# Patient Record
Sex: FEMALE | Race: WHITE | NOT HISPANIC OR LATINO | Employment: UNEMPLOYED | URBAN - NONMETROPOLITAN AREA
[De-identification: names, ages, dates, MRNs, and addresses within clinical notes are randomized per-mention and may not be internally consistent; named-entity substitution may affect disease eponyms.]

---

## 2019-02-01 ENCOUNTER — APPOINTMENT (OUTPATIENT)
Dept: FAMILY MEDICINE | Facility: CLINIC | Age: 24
End: 2019-02-01

## 2019-02-01 PROCEDURE — 99499 UNLISTED E&M SERVICE: CPT | Performed by: NURSE PRACTITIONER

## 2019-10-21 ENCOUNTER — OFFICE VISIT (OUTPATIENT)
Dept: URGENT CARE | Facility: URGENT CARE | Age: 24
End: 2019-10-21

## 2019-10-21 VITALS
RESPIRATION RATE: 20 BRPM | SYSTOLIC BLOOD PRESSURE: 123 MMHG | HEART RATE: 95 BPM | DIASTOLIC BLOOD PRESSURE: 73 MMHG | OXYGEN SATURATION: 99 % | TEMPERATURE: 98.2 F | WEIGHT: 262.6 LBS

## 2019-10-21 DIAGNOSIS — K04.7 DENTAL INFECTION: Primary | ICD-10-CM

## 2019-10-21 PROBLEM — F11.20: Status: ACTIVE | Noted: 2018-06-09

## 2019-10-21 PROBLEM — A41.9 SEPSIS (H): Status: ACTIVE | Noted: 2018-05-22

## 2019-10-21 PROBLEM — O09.73 SUPERVISION OF HIGH RISK PREGNANCY DUE TO SOCIAL PROBLEMS IN THIRD TRIMESTER: Status: ACTIVE | Noted: 2017-07-28

## 2019-10-21 PROBLEM — N17.9 AKI (ACUTE KIDNEY INJURY) (H): Status: ACTIVE | Noted: 2019-10-21

## 2019-10-21 PROBLEM — R93.89 ABNORMAL CT OF THE CHEST: Status: ACTIVE | Noted: 2018-07-02

## 2019-10-21 PROBLEM — F19.10 SUBSTANCE ABUSE (H): Status: ACTIVE | Noted: 2017-10-02

## 2019-10-21 PROBLEM — Z87.898 HISTORY OF INTRAVENOUS DRUG USE IN REMISSION: Status: ACTIVE | Noted: 2018-06-09

## 2019-10-21 PROBLEM — L02.415 ABSCESS OF HIP, RIGHT: Status: ACTIVE | Noted: 2018-06-09

## 2019-10-21 PROBLEM — F11.10: Status: ACTIVE | Noted: 2017-07-28

## 2019-10-21 PROBLEM — M25.551 ACUTE RIGHT HIP PAIN: Status: ACTIVE | Noted: 2018-06-09

## 2019-10-21 PROBLEM — O99.323: Status: ACTIVE | Noted: 2017-07-28

## 2019-10-21 PROBLEM — Z79.891 CHRONIC USE OF OPIATE DRUG FOR THERAPEUTIC PURPOSE: Status: ACTIVE | Noted: 2018-06-09

## 2019-10-21 PROBLEM — I33.0 ENDOCARDITIS DUE TO METHICILLIN SUSCEPTIBLE STAPHYLOCOCCUS AUREUS (MSSA): Status: ACTIVE | Noted: 2018-06-09

## 2019-10-21 PROBLEM — M60.09 INFECTIVE MYOSITIS OF MULTIPLE SITES: Status: ACTIVE | Noted: 2018-06-09

## 2019-10-21 PROBLEM — Z79.899 MEDICATION MANAGEMENT: Status: ACTIVE | Noted: 2018-07-13

## 2019-10-21 PROBLEM — O09.33 INSUFFICIENT PRENATAL CARE IN THIRD TRIMESTER: Status: ACTIVE | Noted: 2017-04-20

## 2019-10-21 PROBLEM — F15.91 HISTORY OF METHAMPHETAMINE USE: Status: ACTIVE | Noted: 2018-06-09

## 2019-10-21 PROBLEM — B95.61 ENDOCARDITIS DUE TO METHICILLIN SUSCEPTIBLE STAPHYLOCOCCUS AUREUS (MSSA): Status: ACTIVE | Noted: 2018-06-09

## 2019-10-21 PROBLEM — R82.5 POSITIVE URINE DRUG SCREEN: Status: ACTIVE | Noted: 2017-04-03

## 2019-10-21 PROCEDURE — 99203 OFFICE O/P NEW LOW 30 MIN: CPT | Performed by: FAMILY MEDICINE

## 2019-10-21 RX ORDER — AMOXICILLIN 875 MG
875 TABLET ORAL 2 TIMES DAILY
Qty: 20 TABLET | Refills: 0 | Status: SHIPPED | OUTPATIENT
Start: 2019-10-21 | End: 2019-10-31

## 2019-10-21 RX ORDER — FERROUS SULFATE 325(65) MG
325 TABLET ORAL
COMMUNITY
End: 2024-07-31

## 2019-10-21 ASSESSMENT — PAIN SCALES - GENERAL: PAINLEVEL: SEVERE PAIN (7)

## 2019-10-21 NOTE — PROGRESS NOTES
SUBJECTIVE:  Chief Complaint   Patient presents with     Dental Problem     Lower right widom tooth infected. Pain x1 week.     Aditi Lei is a 24 year old female with a chief complaint of dental pain .  Onset of symptoms was 7 day(s) ago.  Her right lower 3rd molar (wisdom tooth) is impacted and infected-  Had past plan for removal, but cancelled due to hospitalization  She has attempted to arrange emergency dental care no  Course of illness: gradual onset, still present, worsening and constant.    Severity:  moderate.  Current and Associated symptoms:  Local pain and some drainage  Treatment measures tried include Tylenol/Ibuprofen     Patient Active Problem List   Diagnosis     Abnormal CT of the chest     Abscess of hip, right     Acute right hip pain     EMILIA (acute kidney injury) (H)     Chronic use of opiate drug for therapeutic purpose     Supervision of high risk pregnancy due to social problems in third trimester     Endocarditis due to methicillin susceptible Staphylococcus aureus (MSSA)     Heroin abuse affecting pregnancy in third trimester (H)     History of intravenous drug use in remission     History of methamphetamine use     Infective myositis of multiple sites     Medication management     Insufficient prenatal care in third trimester     Opioid use disorder, severe, in controlled environment, dependence (H)     Positive urine drug screen      labor in third trimester with  delivery     Sepsis (H)     Substance abuse (H)     Vaginal delivery       ALLERGIES:  Patient has no known allergies.    MEDs  ferrous sulfate (FEROSUL) 325 (65 Fe) MG tablet, Take 325 mg by mouth daily (with breakfast)    No current facility-administered medications on file prior to visit.       Social History     Tobacco Use     Smoking status: Current Every Day Smoker     Packs/day: 1.00     Years: 5.00     Pack years: 5.00     Types: Cigarettes     Smokeless tobacco: Never Used   Substance Use Topics      Alcohol use: Not on file     Family History:  Non-contributory,  No associated family health conditions    ROS:  CONSTITUTIONAL:NEGATIVE for fever, chills,   INTEGUMENTARY/SKIN: NEGATIVE for worrisome rashes,  or lesions  EYES: NEGATIVE for vision changes or irritation  RESP:NEGATIVE for significant cough or SOB       OBJECTIVE:   /73   Pulse 95   Temp 98.2  F (36.8  C) (Oral)   Resp 20   Wt 119.1 kg (262 lb 9.6 oz)   SpO2 99%   Patient indicates that the following tooth is causing the pain symptoms:  Right  lower 3rd molar   No noted Caries of the painful tooth with  purulent drainage noted:    Pain with percussion over the affected tooth    Swelling of the gingiva and cheek adjacent to the affected tooth:       EYES:   EOMI,   conjunctiva clear  HENT:   External ears with no swelling or lesions   Nose and lips without  Swelling, ulcers, erythema or lesions  NECK:   normal pain free ROM  RESP:   no labored respirations, no tachypnea  EXTREMITIES:   Full ROM without expression of pain or limitation x 4 extremities  NEURO:   Normal strength and tone, ambulation without difficulty,   normal speech and mentation  SKIN:  no suspicious lesions or rashes  PSYCH:   mentation and affect appears normal and patient appearance--appropriately groomed          ASSESSMENT:  Dental infection      - amoxicillin (AMOXIL) 875 MG tablet; Take 1 tablet (875 mg) by mouth 2 times daily for 10 days       Recommend that patient swish and spit with 10:1 mixture of water and hydrogen peroxide to reduce debris around the infected area     Symptomatic treatment    OTC analgesic (ibuprofen and or acetaminophen)  as needed.   Follow-up with primary dental  clinic for definitive therapy.

## 2024-02-25 ENCOUNTER — HEALTH MAINTENANCE LETTER (OUTPATIENT)
Age: 29
End: 2024-02-25

## 2024-07-31 ENCOUNTER — OFFICE VISIT (OUTPATIENT)
Dept: FAMILY MEDICINE | Facility: CLINIC | Age: 29
End: 2024-07-31
Payer: COMMERCIAL

## 2024-07-31 VITALS
DIASTOLIC BLOOD PRESSURE: 64 MMHG | SYSTOLIC BLOOD PRESSURE: 93 MMHG | WEIGHT: 267 LBS | HEART RATE: 80 BPM | TEMPERATURE: 98.5 F | RESPIRATION RATE: 9 BRPM | HEIGHT: 66 IN | BODY MASS INDEX: 42.91 KG/M2 | OXYGEN SATURATION: 97 %

## 2024-07-31 DIAGNOSIS — I33.0 INFECTIVE ENDOCARDITIS OF TRICUSPID VALVE: ICD-10-CM

## 2024-07-31 DIAGNOSIS — Z98.84 S/P BILIOPANCREATIC DIVERSION WITH DUODENAL SWITCH: ICD-10-CM

## 2024-07-31 DIAGNOSIS — F19.11 HISTORY OF MIXED DRUG ABUSE (H): ICD-10-CM

## 2024-07-31 DIAGNOSIS — Z01.818 PREOP GENERAL PHYSICAL EXAM: Primary | ICD-10-CM

## 2024-07-31 DIAGNOSIS — I36.1 NONRHEUMATIC TRICUSPID VALVE REGURGITATION: ICD-10-CM

## 2024-07-31 DIAGNOSIS — I50.32 CHRONIC HEART FAILURE WITH PRESERVED EJECTION FRACTION (HFPEF) (H): ICD-10-CM

## 2024-07-31 DIAGNOSIS — Z86.711 PERSONAL HISTORY OF PE (PULMONARY EMBOLISM): ICD-10-CM

## 2024-07-31 PROBLEM — Z86.718 HISTORY OF MATERNAL DEEP VEIN THROMBOSIS (DVT): Status: ACTIVE | Noted: 2022-06-08

## 2024-07-31 PROBLEM — F32.A DEPRESSION, UNSPECIFIED: Status: ACTIVE | Noted: 2021-10-28

## 2024-07-31 PROBLEM — Z87.59 HISTORY OF MATERNAL DEEP VEIN THROMBOSIS (DVT): Status: ACTIVE | Noted: 2022-06-08

## 2024-07-31 PROBLEM — M54.32 LEFT SCIATIC NERVE PAIN: Status: ACTIVE | Noted: 2021-11-22

## 2024-07-31 PROBLEM — F15.11 HISTORY OF METHAMPHETAMINE ABUSE (H): Status: ACTIVE | Noted: 2021-10-28

## 2024-07-31 PROBLEM — K44.9 HIATAL HERNIA: Status: ACTIVE | Noted: 2024-04-19

## 2024-07-31 PROBLEM — Z90.49 S/P LAPAROSCOPIC CHOLECYSTECTOMY: Status: ACTIVE | Noted: 2024-04-19

## 2024-07-31 PROBLEM — M54.16 LUMBAR RADICULOPATHY: Status: ACTIVE | Noted: 2021-10-20

## 2024-07-31 PROBLEM — R01.1 HEART MURMUR: Status: ACTIVE | Noted: 2021-11-22

## 2024-07-31 PROBLEM — Z86.79 HISTORY OF SBE (SUBACUTE BACTERIAL ENDOCARDITIS): Status: ACTIVE | Noted: 2021-10-20

## 2024-07-31 PROBLEM — E55.9 VITAMIN D DEFICIENCY: Status: ACTIVE | Noted: 2021-10-30

## 2024-07-31 PROBLEM — M21.372 LEFT FOOT DROP: Status: ACTIVE | Noted: 2021-10-28

## 2024-07-31 PROBLEM — G47.00 INSOMNIA: Status: ACTIVE | Noted: 2021-12-16

## 2024-07-31 LAB
ERYTHROCYTE [DISTWIDTH] IN BLOOD BY AUTOMATED COUNT: 14.3 % (ref 10–15)
HCG UR QL: NEGATIVE
HCT VFR BLD AUTO: 43.1 % (ref 35–47)
HGB BLD-MCNC: 14.4 G/DL (ref 11.7–15.7)
HOLD SPECIMEN: NORMAL
MCH RBC QN AUTO: 30.3 PG (ref 26.5–33)
MCHC RBC AUTO-ENTMCNC: 33.4 G/DL (ref 31.5–36.5)
MCV RBC AUTO: 91 FL (ref 78–100)
PLATELET # BLD AUTO: 186 10E3/UL (ref 150–450)
RBC # BLD AUTO: 4.75 10E6/UL (ref 3.8–5.2)
WBC # BLD AUTO: 6.3 10E3/UL (ref 4–11)

## 2024-07-31 PROCEDURE — 93005 ELECTROCARDIOGRAM TRACING: CPT

## 2024-07-31 PROCEDURE — 80053 COMPREHEN METABOLIC PANEL: CPT

## 2024-07-31 PROCEDURE — 85027 COMPLETE CBC AUTOMATED: CPT

## 2024-07-31 PROCEDURE — 36415 COLL VENOUS BLD VENIPUNCTURE: CPT

## 2024-07-31 PROCEDURE — 81025 URINE PREGNANCY TEST: CPT

## 2024-07-31 PROCEDURE — 83735 ASSAY OF MAGNESIUM: CPT

## 2024-07-31 PROCEDURE — 99204 OFFICE O/P NEW MOD 45 MIN: CPT

## 2024-07-31 RX ORDER — MAGNESIUM OXIDE 400 MG/1
400 TABLET ORAL DAILY
COMMUNITY
Start: 2024-01-26 | End: 2025-01-25

## 2024-07-31 RX ORDER — MULTIVITAMIN
2 TABLET,CHEWABLE ORAL 2 TIMES DAILY
COMMUNITY
Start: 2024-04-29

## 2024-07-31 RX ORDER — PLANT STANOL ESTER 450 MG
2.5 TABLET ORAL
COMMUNITY

## 2024-07-31 RX ORDER — METHOCARBAMOL 500 MG/1
500 TABLET, FILM COATED ORAL 3 TIMES DAILY PRN
COMMUNITY
Start: 2024-04-23

## 2024-07-31 RX ORDER — IBUPROFEN 200 MG
1 CAPSULE ORAL 2 TIMES DAILY
COMMUNITY

## 2024-07-31 RX ORDER — TORSEMIDE 20 MG/1
20 TABLET ORAL EVERY MORNING
COMMUNITY
Start: 2023-02-28

## 2024-07-31 ASSESSMENT — PAIN SCALES - GENERAL: PAINLEVEL: NO PAIN (0)

## 2024-07-31 NOTE — PROGRESS NOTES
Preoperative Evaluation  [facility]  [address] [place] [address]  Phone: [phone]  Fax: [phone]  Primary Provider: Physician No Ref-Primary  Pre-op Performing Provider: LAWANDA Velazquez CNP  Jul 31, 2024 7/31/2024   Surgical Information   What procedure is being done? Tricuspid valve replacement   Facility or Hospital where procedure/surgery will be performed: [facility]   Who is doing the procedure / surgery? Dr. De Leon   Date of surgery / procedure: 8/15/2024   Time of surgery / procedure: 9:30am   Where do you plan to recover after surgery? at home with family        Fax number for surgical facility: Note does not need to be faxed, will be available electronically in Epic.    Assessment & Plan     The proposed surgical procedure is considered INTERMEDIATE risk.    Preop general physical exam  Preoperative exam completed today. Has tolerated previous anesthesia without complications. EKG today NSR with BBB. Revised Cardiac Index Risk is low. Chronic conditions as below. Reviewed medication hold times and preoperative instructions.  - REVIEW OF HEALTH MAINTENANCE PROTOCOL ORDERS  - EKG 12-lead ([facility] and [facility] Clinics Only)  - Comprehensive Metabolic Panel  - CBC w/ Reflex to Iron Studies  - Urine HCG  - Magnesium    Nonrheumatic tricuspid valve regurgitation  Infective endocarditis of tricuspid valve  Chronic heart failure with preserved ejection fraction (HFpEF) (H)  History of IV drug use with endocarditis leading to tricuspid valve regurgitation and heart failure. Continues on torsemide and planning for valve replacement on 8/15/24.    S/P biliopancreatic diversion with duodenal switch  Procedure completed this past April. Been losing weight. BMI down to 43.17    History of mixed drug abuse (H)  History of methamphetamines, heroin, opioid misuse. In remission since 2022.    Personal history of PE (pulmonary embolism)  History of DVT and  PE. No history of clotting disorder. Her last pregnancy was on lovenox through the duration and tolerated well. Not currently on any blood thinners          Risks and Recommendations  The patient has the following additional risks and recommendations for perioperative complications:   - Morbid obesity (BMI >40)  Anemia/Bleeding/Clotting:    - History of DVT or PE, consider DVT prevention postoperatively    Preoperative Medication Instructions  Antiplatelet or Anticoagulation Medication Instructions   - Patient is on no antiplatelet or anticoagulation medications.    Additional Medication Instructions   - Diuretics: May continue due to heart failure.   - Herbal medications and vitamins: DO NOT TAKE 14 days prior to surgery.   - postassium: DO NOT TAKE day of surgery.    Recommendation  Approval given to proceed with proposed procedure pending review of diagnostic evaluation.    Mana Pennington is a 29 year old, presenting for the following:  Establish Care (Est care - pre-op for heart surgery-tricuspid valve replacement on 8/15/24 at [facility] w/ Dr. Raymond. Please verify medications from outside source and add OTC's)          7/31/2024     2:16 PM   Additional Questions   Roomed by Chen MCKEON related to upcoming procedure:   History of infective endocarditis of tricuspid valve.        7/31/2024   Pre-Op Questionnaire   Have you ever had a heart attack or stroke? No   Have you ever had surgery on your heart or blood vessels, such as a stent placement, a coronary artery bypass, or surgery on an artery in your head, neck, heart, or legs? No   Do you have chest pain with activity? (!) YES    Do you have a history of heart failure? (!) YES    Do you currently have a cold, bronchitis or symptoms of other infection? No   Do you have a cough, shortness of breath, or wheezing? No   Do you or anyone in your family have previous history of blood clots? (!) YES - History of DVT and PE   Do you or does anyone in your  family have a serious bleeding problem such as prolonged bleeding following surgeries or cuts? No   Have you ever had problems with anemia or been told to take iron pills? (!) YES - iron deficiency   Have you had any abnormal blood loss such as black, tarry or bloody stools, or abnormal vaginal bleeding? No   Have you ever had a blood transfusion? (!) YES   Have you ever had a transfusion reaction? No   Are you willing to have a blood transfusion if it is medically needed before, during, or after your surgery? Yes   Have you or any of your relatives ever had problems with anesthesia? No   Do you have sleep apnea, excessive snoring or daytime drowsiness? No   Do you have any artifical heart valves or other implanted medical devices like a pacemaker, defibrillator, or continuous glucose monitor? No   Do you have artificial joints? No   Are you allergic to latex? No        Health Care Directive  Patient does not have a Health Care Directive or Living Will: Discussed advance care planning with patient; however, patient declined at this time.    Preoperative Review of    reviewed - controlled substances prescribed by other outside provider(s). Post-op dilaudid from April - no longer has this at home.      Status of Chronic Conditions:  See problem list for active medical problems.  Problems all longstanding and stable, except as noted/documented.  See ROS for pertinent symptoms related to these conditions.    Patient Active Problem List    Diagnosis Date Noted    Chronic heart failure with preserved ejection fraction (HFpEF) (H) 05/13/2024     Priority: Medium    Hiatal hernia 04/19/2024     Priority: Medium    S/P biliopancreatic diversion with duodenal switch 04/19/2024     Priority: Medium     Formatting of this note might be different from the original.   Dr. Manning  Formatting of this note might be different from the original.   Formatting of this note might be different from the original.    Dr. Manning      S/P  laparoscopic cholecystectomy 04/19/2024     Priority: Medium     Formatting of this note might be different from the original.   Dr. Manning  Formatting of this note might be different from the original.   Formatting of this note might be different from the original.    Dr. Manning      History of maternal deep vein thrombosis (DVT) 06/08/2022     Priority: Medium    Infective endocarditis of tricuspid valve 06/08/2022     Priority: Medium     Formatting of this note might be different from the original.   Sepsis 2018 d/t IV drug use:    Tricuspid Valve Endocarditis    DVT with Multiple septic pulmonary emboli     Acute kidney injury    C Difficile Colitis    MRSA/MSSA   Sepsis 2019 d/t IV drug use   Tricuspid Valve Endocarditis 10/2021      Personal history of PE (pulmonary embolism) 06/08/2022     Priority: Medium    Insomnia 12/16/2021     Priority: Medium    Heart murmur 11/22/2021     Priority: Medium     Formatting of this note is different from the original.   Formatting of this note might be different from the original.   Patient reports a three year history of heart murmur.  Has had endocarditis x 3.  Has a follow up appt with her Cardiologist within the next month in [place], [place].       Last Assessment & Plan:    Formatting of this note might be different from the original.   Relevant Hx: Patient reports a three year history of heart murmur.  Has had endocarditis x 3.  Has a follow up appt with her Cardiologist within the next month in [place], [place].       Today's Plan: Patient denies chest pain or cardiac symptomology currently.  We will continue to monitor.  Patient encouraged to follow-up with her cardiologist as scheduled.      10/2022 Echo done   Findings:Left Ventricle      The left ventricle is normal in size.      There is no left ventricular hypertrophy.      Left ventricular systolic function is normal.      The estimated ejection fraction is 55-60%.      Left ventricular segmental wall motion is  grossly normal however,    endocardial definition is limited.      The left ventricular diastolic function is normal.    Right Ventricle      The right ventricle is severely enlarged.      Mildly reduced right ventricular systolic function.      The RV/LV ratio is 1.2 cm.    Left Atrium      The left atrium is normal in size.    Right Atrium      The right atrium is enlarged.    Atrial Septum      Interatrial septum is poorly visually, cannot rule out presence of    interatrial shunt..      The atrial septum is bowed.      Bubble study was not obtained due to patient being pregnant in third    trimester.    Aortic Valve      The aortic valve is trileaflet.      There is no aortic stenosis with a peak velocity of 133 cm/s and a mean    gradient of 3.38 mmHg.      There is no aortic regurgitation.    Pulmonary Valve      The pulmonic valve is structurally normal.      There is no Doppler evidence of pulmonic valve sclerosis or stenosis.      There is trace pulmonic regurgitation.    Mitral Valve      The mitral valve leaflets are structurally normal.      No mitral annular calcification.      There is no mitral stenosis.      There is no mitral regurgitation.    Tricuspid Valve      The tricuspid valve leaflets are thickened.      There is no tricuspid stenosis.      There is severe tricuspid regurgitation.      No pulmonary hypertension, estimated pulmonary arterial systolic pressure     is    34 mmHg (this could be underestimated).    Pericardium / Pleural Effusion      There is no pericardial effusion visualized.    Inferior Vena Cava      The IVC is dilated (> 2.1 cm), > 50% respiratory variance, RA pressure    elevated at 8 mmHg.    Aorta      The aortic root at the sinus of valsalva is normal in size measuring      3.16    cm with an index of 1.29 cm/m2.      The proximal ascending aorta is normal in size measuring 3.07 cm with an    index of 1.25 cm/m2.      Left sciatic nerve pain 11/22/2021     Priority:  "Medium     Formatting of this note might be different from the original.   Last Assessment & Plan:    Formatting of this note might be different from the original.   Relevant Hx: Patient reports a history of \"Compressive Neuropathy\" of the left leg, with a herniated disc. Diagnosed at the \"[facility]\" 4 weeks ago. No treatment. Has brace for \"foot drop\".  Reports she is now able to move her toes without difficulty.        Moved to [place] recently for drug treatment.       Today's Plan: Outside records reviewed her CT and MRI of the low spine.  Patient referred to the pain clinic to address her disc related issues and pain.      Vitamin D deficiency 10/30/2021     Priority: Medium    Depression, unspecified 10/28/2021     Priority: Medium    History of methamphetamine abuse (H) 10/28/2021     Priority: Medium     Formatting of this note might be different from the original.   Sobriety since 3/17/22:  Completed inpatient and outpatient program  Formatting of this note might be different from the original.   Formatting of this note might be different from the original.   Sobriety since 3/17/22:  Completed inpatient and outpatient program      Left foot drop 10/28/2021     Priority: Medium    History of mixed drug abuse (H) 10/20/2021     Priority: Medium    History of SBE (subacute bacterial endocarditis) 10/20/2021     Priority: Medium    Lumbar radiculopathy 10/20/2021     Priority: Medium    Nonrheumatic tricuspid valve regurgitation 10/20/2021     Priority: Medium     Formatting of this note might be different from the original.   Sepsis 2018 d/t IV drug use:    Tricuspid Valve Endocarditis    DVT with Multiple septic pulmonary emboli     Acute kidney injury    C Difficile Colitis    MRSA/MSSA   Sepsis 2019 d/t IV drug use   Tricuspid Valve Endocarditis 10/2021   Torrential TR per cMRI and echo      EMILIA (acute kidney injury) (H24) 10/21/2019     Priority: Medium    Medication management 07/13/2018     " Priority: Medium    Abnormal CT of the chest 2018     Priority: Medium    Abscess of hip, right 2018     Priority: Medium    Acute right hip pain 2018     Priority: Medium    Chronic use of opiate drug for therapeutic purpose 2018     Priority: Medium     2019 IMO Update      Endocarditis due to methicillin susceptible Staphylococcus aureus (MSSA) 2018     Priority: Medium    History of intravenous drug use in remission 2018     Priority: Medium    History of methamphetamine use 2018     Priority: Medium    Infective myositis of multiple sites 2018     Priority: Medium    Opioid use disorder, severe, in controlled environment, dependence (H) 2018     Priority: Medium    Sepsis (H) 2018     Priority: Medium    Substance abuse (H) 10/02/2017     Priority: Medium    Supervision of high risk pregnancy due to social problems in third trimester 2017     Priority: Medium    Heroin abuse affecting pregnancy in third trimester (H) 2017     Priority: Medium     labor in third trimester with  delivery 2017     Priority: Medium    Insufficient prenatal care in third trimester 2017     Priority: Medium    Positive urine drug screen 2017     Priority: Medium    Vaginal delivery 2016     Priority: Medium      No past medical history on file.  No past surgical history on file.  Current Outpatient Medications   Medication Sig Dispense Refill    calcium citrate (CITRACAL) 950 (200 Ca) MG tablet Take 1 tablet by mouth 2 times daily      levonorgestrel (MIRENA) 52 MG (20 mcg/day) IUD 1 Intra Uterine Device by Intrauterine route once      magnesium oxide (MAG-OX) 400 MG tablet Take 400 mg by mouth daily      methocarbamol (ROBAXIN) 500 MG tablet Take 500 mg by mouth 3 times daily as needed for muscle spasms      Methylcobalamin 1000 MCG SUBL Place 1,000 mcg under the tongue daily      Pediatric Multiple Vitamins  "(FLINTSTONES PLUS EXTRA C) CHEW Take 2 tablets by mouth 2 times daily      potassium gluconate 2.5 MEQ tablet Take 2.5 mEq by mouth      torsemide (DEMADEX) 20 MG tablet Take 20 mg by mouth every morning      vitamin D3 (CHOLECALCIFEROL) 125 MCG (5000 UT) tablet Take 125 mcg by mouth daily         No Known Allergies     Social History     Tobacco Use    Smoking status: Every Day     Current packs/day: 1.00     Average packs/day: 1 pack/day for 5.0 years (5.0 ttl pk-yrs)     Types: Cigarettes    Smokeless tobacco: Never   Substance Use Topics    Alcohol use: Not on file     No family history on file.    History   Drug Use Not on file             Review of Systems  CONSTITUTIONAL: NEGATIVE for fever, chills, change in weight  INTEGUMENTARY/SKIN: NEGATIVE for worrisome rashes, moles or lesions  EYES: NEGATIVE for vision changes or irritation  ENT/MOUTH: NEGATIVE for ear, mouth and throat problems  RESP: NEGATIVE for significant cough or SOB  CV: NEGATIVE for chest pain, palpitations or peripheral edema  GI: NEGATIVE for nausea, abdominal pain, heartburn, or change in bowel habits  : NEGATIVE for frequency, dysuria, or hematuria  MUSCULOSKELETAL: NEGATIVE for significant arthralgias or myalgia  NEURO: NEGATIVE for weakness, dizziness or paresthesias  ENDOCRINE: NEGATIVE for temperature intolerance, skin/hair changes  HEME/ALLERGY/IMMUNE: hx of DVT and PE  PSYCHIATRIC: NEGATIVE for changes in mood or affect    Objective    BP 93/64 (BP Location: Left arm, Patient Position: Sitting, Cuff Size: Adult Regular)   Pulse 80   Temp 98.5  F (36.9  C) (Tympanic)   Resp (!) 9   Ht 1.675 m (5' 5.95\")   Wt 121.1 kg (267 lb)   LMP  (LMP Unknown)   SpO2 97%   BMI 43.17 kg/m     Estimated body mass index is 43.17 kg/m  as calculated from the following:    Height as of this encounter: 1.675 m (5' 5.95\").    Weight as of this encounter: 121.1 kg (267 lb).    Physical Exam  GENERAL: alert and no distress  EYES: Eyes grossly " "normal to inspection, PERRL and conjunctivae and sclerae normal  HENT: ear canals and TM's normal, nose and mouth without ulcers or lesions  NECK: no adenopathy, no asymmetry, masses, or scars  RESP: lungs clear to auscultation - no rales, rhonchi or wheezes  CV: regular rates and rhythm, peripheral pulses strong, no peripheral edema, and murmur  ABDOMEN: soft, nontender, no hepatosplenomegaly, no masses and bowel sounds normal  MS: no gross musculoskeletal defects noted, no edema  SKIN: no suspicious lesions or rashes  NEURO: Normal strength and tone, mentation intact and speech normal  PSYCH: mentation appears normal, affect normal/bright    No results for input(s): \"HGB\", \"PLT\", \"INR\", \"NA\", \"POTASSIUM\", \"CR\", \"A1C\" in the last 8760 hours.     Diagnostics  Labs pending at this time.  Results will be reviewed when available.   EKG required for structural heart disease and not completed in the last 90 days.     Revised Cardiac Risk Index (RCRI)  The patient has the following serious cardiovascular risks for perioperative complications:   - Congestive Heart Failure (pulmonary edema, PND, s3 gretchen, CXR with pulmonary congestion, basilar rales) = 1 point     RCRI Interpretation: 1 point: Class II (low risk - 0.9% complication rate)       Signed Electronically by: LAWANDA Velazquez CNP  A copy of this evaluation report is provided to the requesting physician.    "

## 2024-07-31 NOTE — PATIENT INSTRUCTIONS

## 2024-08-01 LAB
ALBUMIN SERPL BCG-MCNC: 4.5 G/DL (ref 3.5–5.2)
ALP SERPL-CCNC: 91 U/L (ref 40–150)
ALT SERPL W P-5'-P-CCNC: 65 U/L (ref 0–50)
ANION GAP SERPL CALCULATED.3IONS-SCNC: 14 MMOL/L (ref 7–15)
AST SERPL W P-5'-P-CCNC: 46 U/L (ref 0–45)
ATRIAL RATE - MUSE: 70 BPM
BILIRUB SERPL-MCNC: 1.1 MG/DL
BUN SERPL-MCNC: 17.9 MG/DL (ref 6–20)
CALCIUM SERPL-MCNC: 9.5 MG/DL (ref 8.8–10.4)
CHLORIDE SERPL-SCNC: 98 MMOL/L (ref 98–107)
CREAT SERPL-MCNC: 0.85 MG/DL (ref 0.51–0.95)
DIASTOLIC BLOOD PRESSURE - MUSE: NORMAL MMHG
EGFRCR SERPLBLD CKD-EPI 2021: >90 ML/MIN/1.73M2
GLUCOSE SERPL-MCNC: 87 MG/DL (ref 70–99)
HCO3 SERPL-SCNC: 29 MMOL/L (ref 22–29)
INTERPRETATION ECG - MUSE: NORMAL
MAGNESIUM SERPL-MCNC: 1.8 MG/DL (ref 1.7–2.3)
P AXIS - MUSE: 18 DEGREES
POTASSIUM SERPL-SCNC: 3.6 MMOL/L (ref 3.4–5.3)
PR INTERVAL - MUSE: 190 MS
PROT SERPL-MCNC: 7.4 G/DL (ref 6.4–8.3)
QRS DURATION - MUSE: 110 MS
QT - MUSE: 460 MS
QTC - MUSE: 496 MS
R AXIS - MUSE: 119 DEGREES
SODIUM SERPL-SCNC: 141 MMOL/L (ref 135–145)
SYSTOLIC BLOOD PRESSURE - MUSE: NORMAL MMHG
T AXIS - MUSE: 22 DEGREES
VENTRICULAR RATE- MUSE: 70 BPM

## 2024-08-01 PROCEDURE — 93010 ELECTROCARDIOGRAM REPORT: CPT | Performed by: INTERNAL MEDICINE

## 2024-08-15 LAB — INR (EXTERNAL): 1.5 (ref 0.9–1.3)

## 2024-08-17 LAB — INR (EXTERNAL): 2 (ref ?–1.3)

## 2024-08-21 DIAGNOSIS — Z95.4 S/P TRICUSPID VALVE REPLACEMENT: Primary | ICD-10-CM

## 2024-08-22 ENCOUNTER — LAB (OUTPATIENT)
Dept: LAB | Facility: CLINIC | Age: 29
End: 2024-08-22
Payer: COMMERCIAL

## 2024-08-22 DIAGNOSIS — Z95.4 S/P TRICUSPID VALVE REPLACEMENT: ICD-10-CM

## 2024-08-22 DIAGNOSIS — Z11.4 SCREENING FOR HIV (HUMAN IMMUNODEFICIENCY VIRUS): Primary | ICD-10-CM

## 2024-08-22 DIAGNOSIS — Z95.4 TRICUSPID VALVE REPLACED: ICD-10-CM

## 2024-08-22 DIAGNOSIS — Z11.59 NEED FOR HEPATITIS C SCREENING TEST: ICD-10-CM

## 2024-08-22 LAB — INR PPP: 1.83 (ref 0.85–1.15)

## 2024-08-22 PROCEDURE — 85610 PROTHROMBIN TIME: CPT

## 2024-08-22 PROCEDURE — 36415 COLL VENOUS BLD VENIPUNCTURE: CPT

## 2024-08-23 ENCOUNTER — ANTICOAGULATION THERAPY VISIT (OUTPATIENT)
Dept: ANTICOAGULATION | Facility: CLINIC | Age: 29
End: 2024-08-23
Payer: MEDICAID

## 2024-08-23 DIAGNOSIS — Z95.4 S/P TRICUSPID VALVE REPLACEMENT: Primary | ICD-10-CM

## 2024-08-23 NOTE — PROGRESS NOTES
"ANTICOAGULATION  MANAGEMENT: NEW REFERRAL      SUBJECTIVE/OBJECTIVE     Aditi Lei, a 29 year old female  is newly referred to [facility].    Anticoagulation:    Previously on warfarin:  hx of warfarin, does not have details  Warfarin initiation date (approximate): 8/16/24   Indication(s):  tricuspid valve replacement   Goal Range: 2.0-3.0   Anticoagulation Bridge/Overlap: ASA until > 2.0   Referring provider: from PCP    General Dietary/Social Hx:    Typical vitamin K intake: low; consistent  maybe one serving weekly or less    Other dietary considerations: None     Social History:   Social History     Tobacco Use    Smoking status: Every Day     Current packs/day: 1.00     Average packs/day: 1 pack/day for 5.0 years (5.0 ttl pk-yrs)     Types: Cigarettes    Smokeless tobacco: Never       In the past 2 weeks, patient estimates taking medications as instructed % of time: 100    Results:        Recent labs: (last 7 days)     08/22/24  0901   INR 1.83*       Wt Readings from Last 2 Encounters:   07/31/24 121.1 kg (267 lb)   10/21/19 119.1 kg (262 lb 9.6 oz)      Estimated body mass index is 43.17 kg/m  as calculated from the following:    Height as of 7/31/24: 1.675 m (5' 5.95\").    Weight as of 7/31/24: 121.1 kg (267 lb).  Lab Results   Component Value Date    AST 46 (H) 07/31/2024    ALT 65 (H) 07/31/2024    ALBUMIN 4.5 07/31/2024     Lab Results   Component Value Date    CR 0.85 07/31/2024     Estimated Creatinine Clearance: 129.5 mL/min (based on SCr of 0.85 mg/dL).    ASSESSMENT     Goal INR 2-3, standard for indication(s) above  Establishing initial warfarin maintenance dose (on warfarin < 30 days)   Factors that may increase sensitivity to warfarin: Baseline INR >=1.3, Female gender, and Low greens/vitamin K intake  Factors that may reduce sensitivity to warfarin: Age <55 and Weight > 90 kg  Potential significant drug interactions with home medications: None noted  Starting " warfarin dose is appropriate for patient's anticipated sensitivity to warfarin    PLAN     Dosing Instructions: Continue your current warfarin dose with INR in 3 days       Summary  As of 8/23/2024      Full warfarin instructions:  5 mg every day   Next INR check:  8/26/2024               Education provided:   Taking warfarin: purpose of warfarin and how it works, take warfarin at same time each day; preferably in the evening, and prescribed tablet strength and color  Goal range and lab monitoring: goal range and significance of current result, Importance of therapeutic range, Importance of following up at instructed interval, and frequency of lab work when starting warfarin and importance of following up when instructed (extends after stability established)  Dietary considerations: importance of consistent vitamin K intake and importance of notifying [facility] to changes in diet  Symptom monitoring: monitoring for bleeding signs and symptoms, when to seek medical attention/emergency care, and if you hit your head or have a bad fall seek emergency care  Importance of notifying anticoagulation clinic for: changes in medications; a sooner lab recheck maybe needed and diarrhea, nausea/vomiting, reduced intake, cold/flu, and/or infections; a sooner lab recheck maybe needed    Education still needed:   Contact [phone] with any changes, questions or concerns.       Telephone call with Aditi who verbalizes understanding and agrees to plan    Lab visit scheduled    Standing orders placed in Epic: Point of Care INR (Lab 5000)    Plan made per [facility] anticoagulation protocol    Nilson uMrillo RN  Anticoagulation Clinic  8/23/2024

## 2024-08-26 ENCOUNTER — ANTICOAGULATION THERAPY VISIT (OUTPATIENT)
Dept: ANTICOAGULATION | Facility: CLINIC | Age: 29
End: 2024-08-26

## 2024-08-26 ENCOUNTER — LAB (OUTPATIENT)
Dept: LAB | Facility: CLINIC | Age: 29
End: 2024-08-26
Payer: COMMERCIAL

## 2024-08-26 DIAGNOSIS — Z95.4 S/P TRICUSPID VALVE REPLACEMENT: Primary | ICD-10-CM

## 2024-08-26 DIAGNOSIS — Z95.4 S/P TRICUSPID VALVE REPLACEMENT: ICD-10-CM

## 2024-08-26 LAB — INR BLD: 2.4 (ref 0.9–1.1)

## 2024-08-26 PROCEDURE — 85610 PROTHROMBIN TIME: CPT

## 2024-08-26 PROCEDURE — 36416 COLLJ CAPILLARY BLOOD SPEC: CPT

## 2024-08-29 ENCOUNTER — LAB (OUTPATIENT)
Dept: LAB | Facility: CLINIC | Age: 29
End: 2024-08-29
Payer: COMMERCIAL

## 2024-08-29 ENCOUNTER — ANTICOAGULATION THERAPY VISIT (OUTPATIENT)
Dept: ANTICOAGULATION | Facility: CLINIC | Age: 29
End: 2024-08-29

## 2024-08-29 DIAGNOSIS — Z95.4 S/P TRICUSPID VALVE REPLACEMENT: ICD-10-CM

## 2024-08-29 DIAGNOSIS — Z95.4 S/P TRICUSPID VALVE REPLACEMENT: Primary | ICD-10-CM

## 2024-08-29 LAB — INR BLD: 2.2 (ref 0.9–1.1)

## 2024-08-29 PROCEDURE — 36415 COLL VENOUS BLD VENIPUNCTURE: CPT

## 2024-08-29 PROCEDURE — 85610 PROTHROMBIN TIME: CPT

## 2024-08-30 ENCOUNTER — NURSE TRIAGE (OUTPATIENT)
Dept: FAMILY MEDICINE | Facility: CLINIC | Age: 29
End: 2024-08-30
Payer: MEDICAID

## 2024-08-30 ENCOUNTER — MYC MEDICAL ADVICE (OUTPATIENT)
Dept: FAMILY MEDICINE | Facility: CLINIC | Age: 29
End: 2024-08-30
Payer: MEDICAID

## 2024-08-30 DIAGNOSIS — T83.32XA INTRAUTERINE CONTRACEPTIVE DEVICE THREADS LOST, INITIAL ENCOUNTER: Primary | ICD-10-CM

## 2024-08-30 NOTE — TELEPHONE ENCOUNTER
Provider Response to 2nd Level Triage Request    I have reviewed the RN documentation. My recommendation is:  Heavy menstrual bleeding is not uncommon with warfarin. INR was not supratherapeutic when checked yesterday. She will need additional work up to rule out anything that may be contributing to her heavy menstrual bleeding. I see she has an appointment with me at the beginning of September. We can discuss at that time. Otherwise if her bleeding gets worse, develops severe pelvic pain, or she starts to feel fatigued, dizzy, short of breath she should go to urgent care.    LAWANDA Velazquez CNP

## 2024-08-30 NOTE — TELEPHONE ENCOUNTER
"Nurse Triage SBAR    Is this a 2nd Level Triage? YES, LICENSED PRACTITIONER REVIEW IS REQUIRED    Situation: Patient has had vaginal bleeding for 3 days and states it is very heavy.     Background:  Patient has Mirena IUD in place and normally does not get periods but did have one 2 weeks ago due to stress. Patient is on Warfarin blood thinner.     Assessment:  Reports heavy bleeding with some clots the size of a \"mini candy bar.\" Bleeding is maroon in color. Not soaking >1 pad in 1 hour span. Using super absorbant tampons. Patient reports \"I'm not super concerned unless it's lasting longer than a week but the INR people told me I need to call my PCP.\"  Reports no change of pregnancy and had negative UPT 2 weeks ago. Denies any pain or cramping associated with the bleeding.     Protocol Recommended Disposition:   Go To Office Now    Recommendation:  Disposition is to be seen in office today. Advised patient to go to urgent care. Patient declines this. Prefers a message sent to Kandi. Patient asks if there are any medications that can lessen or stop the menstrual cycle bleeding    Routed to provider    Does the patient meet one of the following criteria for ADS visit consideration? 16+ years old, with an [facility] PCP     TIP  Providers, please consider if this condition is appropriate for management at one of our Acute and Diagnostic Services sites.     If patient is a good candidate, please use dotphrase <dot>triageresponse and select Refer to ADS to document.      Reason for Disposition   MODERATE vaginal bleeding (i.e., soaking pad or tampon per hour and present > 6 hours; 1 menstrual cup every 6 hours)    Additional Information   Negative: SEVERE vaginal bleeding (e.g., continuous red blood from vagina, or large blood clots) and very weak (can't stand)   Negative: Passed out (i.e., fainted, collapsed and was not responding)   Negative: Difficult to awaken or acting confused (e.g., disoriented, slurred speech)   " "Negative: Shock suspected (e.g., cold/pale/clammy skin, too weak to stand, low BP, rapid pulse)   Negative: Sounds like a life-threatening emergency to the triager   Negative: Pregnant 20 or more weeks (5 months or more)   Negative: Pregnant < 20 weeks (less than 5 months)   Negative: Postpartum (from 0 to 6 weeks after delivery)   Negative: Vaginal discharge is main symptom and bleeding is slight   Negative: SEVERE abdominal pain (e.g., excruciating)   Negative: SEVERE dizziness (e.g., unable to stand, requires support to walk, feels like passing out now)   Negative: SEVERE vaginal bleeding (e.g., soaking 2 pads or tampons per hour and present 2 or more hours; 1 menstrual cup every 2 hours)   Negative: Patient sounds very sick or weak to the triager    Answer Assessment - Initial Assessment Questions  1. AMOUNT: \"Describe the bleeding that you are having.\"     - SPOTTING: spotting, or pinkish / brownish mucous discharge; does not fill panty liner or pad     - MILD:  less than 1 pad / hour; less than patient's usual menstrual bleeding    - MODERATE: 1-2 pads / hour; 1 menstrual cup every 6 hours; small-medium blood clots (e.g., pea, grape, small coin)    - SEVERE: soaking 2 or more pads/hour for 2 or more hours; 1 menstrual cup every 2 hours; bleeding not contained by pads or continuous red blood from vagina; large blood clots (e.g., golf ball, large coin)       Bleeding is dark red/vivian with clots. Clots the size of a \"mini twix\"  2. ONSET: \"When did the bleeding begin?\" \"Is it continuing now?\"      3 days   3. MENSTRUAL PERIOD: \"When was the last normal menstrual period?\" \"How is this different than your period?\"      2 weeks ago.   4. REGULARITY: \"How regular are your periods?\"      I dont usually get them.   5. ABDOMEN PAIN: \"Do you have any pain?\" \"How bad is the pain?\"  (e.g., Scale 1-10; mild, moderate, or severe)    - MILD (1-3): doesn't interfere with normal activities, abdomen soft and not tender to " "touch     - MODERATE (4-7): interferes with normal activities or awakens from sleep, abdomen tender to touch     - SEVERE (8-10): excruciating pain, doubled over, unable to do any normal activities       No   6. PREGNANCY: \"Is there any chance you are pregnant?\" \"When was your last menstrual period?\"      No I took a pregnancy test 2 weeks ago and it was negative.   7. BREASTFEEDING: \"Are you breastfeeding?\"      No  8. HORMONE MEDICINES: \"Are you taking any hormone medicines, prescription or over-the-counter?\" (e.g., birth control pills, estrogen)      NO  9. BLOOD THINNER MEDICINES: \"Do you take any blood thinners?\" (e.g., Coumadin / warfarin, Pradaxa / dabigatran, aspirin)      YES warfarin  10. CAUSE: \"What do you think is causing the bleeding?\" (e.g., recent gyn surgery, recent gyn procedure; known bleeding disorder, cervical cancer, polycystic ovarian disease, fibroids)          Stress and warfarin  11. HEMODYNAMIC STATUS: \"Are you weak or feeling lightheaded?\" If Yes, ask: \"Can you stand and walk normally?\"         No  12. OTHER SYMPTOMS: \"What other symptoms are you having with the bleeding?\" (e.g., passed tissue, vaginal discharge, fever, menstrual-type cramps)        Nothing    Protocols used: Vaginal Bleeding - [id number]    "

## 2024-08-31 ENCOUNTER — MYC MEDICAL ADVICE (OUTPATIENT)
Dept: FAMILY MEDICINE | Facility: CLINIC | Age: 29
End: 2024-08-31
Payer: MEDICAID

## 2024-09-03 ENCOUNTER — ANTICOAGULATION THERAPY VISIT (OUTPATIENT)
Dept: ANTICOAGULATION | Facility: CLINIC | Age: 29
End: 2024-09-03

## 2024-09-03 ENCOUNTER — LAB (OUTPATIENT)
Dept: LAB | Facility: CLINIC | Age: 29
End: 2024-09-03
Payer: MEDICAID

## 2024-09-03 DIAGNOSIS — Z95.4 S/P TRICUSPID VALVE REPLACEMENT: Primary | ICD-10-CM

## 2024-09-03 DIAGNOSIS — Z95.4 S/P TRICUSPID VALVE REPLACEMENT: ICD-10-CM

## 2024-09-03 LAB — INR BLD: 1.5 (ref 0.9–1.1)

## 2024-09-03 PROCEDURE — 36415 COLL VENOUS BLD VENIPUNCTURE: CPT

## 2024-09-03 PROCEDURE — 85610 PROTHROMBIN TIME: CPT

## 2024-09-03 NOTE — PROGRESS NOTES
ANTICOAGULATION MANAGEMENT     Aditi Lei 29 year old female is on warfarin with therapeutic INR result. (Goal INR 2.0-3.0)    Recent labs: (last 7 days)     09/03/24  1101   INR 1.5*       ASSESSMENT     Warfarin Lab Questionnaire    Warfarin Doses Last 7 Days      9/3/2024    10:57 AM   Dose in Tablet or Mg   TAB or MG? milligram (mg)     Pt Rptd Dose ALVARADO MONDAY TUESDAY WED THURS FRIDAY SATURDAY   9/3/2024  10:57 AM 5 7.5 5 5 7.5 5 5         9/3/2024   Warfarin Lab Questionnaire   Missed doses within past 14 days? No   Changes in diet or alcohol within past 14 days? No   Medication changes since last result? No   Injuries or illness since last result? No   New shortness of breath, severe headaches or sudden changes in vision since last result? No   Abnormal bleeding since last result? No   Upcoming surgery, procedure? No   Best number to call with results? [phone]        Previous result: Therapeutic last 2(+) visits  Additional findings: Recent heart valve replacement in last 10 weeks       PLAN     Recommended plan for no diet, medication or health factor changes affecting INR     Dosing Instructions: Increase your warfarin dose (25% change) with next INR in 3 days       Summary  As of 9/3/2024      Full warfarin instructions:  5 mg every Sun; 7.5 mg all other days   Next INR check:  9/6/2024               Telephone call with Aditi who verbalizes understanding and agrees to plan    Lab visit scheduled    Education provided: Please call back if any changes to your diet, medications or how you've been taking warfarin    Plan made per [facility] anticoagulation protocol    Nilson Murillo RN  Anticoagulation Clinic  9/3/2024    _______________________________________________________________________     Anticoagulation Episode Summary       Current INR goal:  2.0-3.0   TTR:  0.0% (1 d)   Target end date:  Indefinite   Send INR reminders to:  [facility]    Indications    S/P tricuspid valve replacement  [Z95.4]             Comments:  33 mm Epic Plus             Anticoagulation Care Providers       Provider Role Specialty Phone number    Kandi Mccallum APRN CNP Referring Nurse Practitioner Primary Care [phone]

## 2024-09-03 NOTE — TELEPHONE ENCOUNTER
Patient reports that her period is now tapering off. Patient thinks that she has lost her IUD. States that the strings were very long for awhile and now she can not feel the strings at all. Thinks that it came out with one of her large blood clots. She would like to get another IUD placed as soon as possible.

## 2024-09-04 ENCOUNTER — OFFICE VISIT (OUTPATIENT)
Dept: FAMILY MEDICINE | Facility: CLINIC | Age: 29
End: 2024-09-04
Payer: MEDICAID

## 2024-09-04 VITALS
OXYGEN SATURATION: 98 % | HEIGHT: 70 IN | SYSTOLIC BLOOD PRESSURE: 104 MMHG | RESPIRATION RATE: 17 BRPM | BODY MASS INDEX: 35.78 KG/M2 | TEMPERATURE: 98 F | DIASTOLIC BLOOD PRESSURE: 62 MMHG | HEART RATE: 88 BPM | WEIGHT: 249.9 LBS

## 2024-09-04 DIAGNOSIS — Z11.59 NEED FOR HEPATITIS C SCREENING TEST: ICD-10-CM

## 2024-09-04 DIAGNOSIS — Z95.4 S/P TRICUSPID VALVE REPLACEMENT: ICD-10-CM

## 2024-09-04 DIAGNOSIS — Z11.4 SCREENING FOR HIV (HUMAN IMMUNODEFICIENCY VIRUS): ICD-10-CM

## 2024-09-04 DIAGNOSIS — E66.01 CLASS 2 SEVERE OBESITY DUE TO EXCESS CALORIES WITH SERIOUS COMORBIDITY AND BODY MASS INDEX (BMI) OF 36.0 TO 36.9 IN ADULT (H): ICD-10-CM

## 2024-09-04 DIAGNOSIS — T83.32XA INTRAUTERINE CONTRACEPTIVE DEVICE THREADS LOST, INITIAL ENCOUNTER: Primary | ICD-10-CM

## 2024-09-04 DIAGNOSIS — E66.812 CLASS 2 SEVERE OBESITY DUE TO EXCESS CALORIES WITH SERIOUS COMORBIDITY AND BODY MASS INDEX (BMI) OF 36.0 TO 36.9 IN ADULT (H): ICD-10-CM

## 2024-09-04 DIAGNOSIS — N92.0 MENORRHAGIA WITH REGULAR CYCLE: ICD-10-CM

## 2024-09-04 LAB
ERYTHROCYTE [DISTWIDTH] IN BLOOD BY AUTOMATED COUNT: 14.2 % (ref 10–15)
HCT VFR BLD AUTO: 35.6 % (ref 35–47)
HGB BLD-MCNC: 12 G/DL (ref 11.7–15.7)
MCH RBC QN AUTO: 30.7 PG (ref 26.5–33)
MCHC RBC AUTO-ENTMCNC: 33.7 G/DL (ref 31.5–36.5)
MCV RBC AUTO: 91 FL (ref 78–100)
PLATELET # BLD AUTO: 265 10E3/UL (ref 150–450)
RBC # BLD AUTO: 3.91 10E6/UL (ref 3.8–5.2)
WBC # BLD AUTO: 5.7 10E3/UL (ref 4–11)

## 2024-09-04 PROCEDURE — 36415 COLL VENOUS BLD VENIPUNCTURE: CPT

## 2024-09-04 PROCEDURE — 87389 HIV-1 AG W/HIV-1&-2 AB AG IA: CPT

## 2024-09-04 PROCEDURE — 86803 HEPATITIS C AB TEST: CPT

## 2024-09-04 PROCEDURE — 85027 COMPLETE CBC AUTOMATED: CPT

## 2024-09-04 PROCEDURE — 84443 ASSAY THYROID STIM HORMONE: CPT

## 2024-09-04 PROCEDURE — 99214 OFFICE O/P EST MOD 30 MIN: CPT

## 2024-09-04 RX ORDER — CEPHALEXIN 500 MG/1
500 CAPSULE ORAL EVERY 12 HOURS
COMMUNITY
Start: 2024-08-28 | End: 2024-09-07

## 2024-09-04 NOTE — PROGRESS NOTES
"  Assessment & Plan     Intrauterine contraceptive device threads lost, initial encounter  - US Pelvic Complete with Transvaginal  - Ob/Gyn  Referral  Menorrhagia with regular cycle  - TSH WITH FREE T4 REFLEX  - CBC with Platelets  - US Pelvic Complete with Transvaginal  - Ob/Gyn  Referral    History of menorrhagia managed with hormonal IUD now worsened with starting warfarin.  Last cycle with heavy flow and clots.  I was unable to located IUD strings on exam today.  Will plan to get labs, pelvic ultrasound and referral to OB/GYN.  She would like to replace her IUD if it is no longer in place.    S/P tricuspid valve replacement  Stable. Saw CV surgery this morning and doing well. Continues are warfarin.    Class 2 severe obesity due to excess calories with serious comorbidity and body mass index (BMI) of 36.0 to 36.9 in adult (H)  S/p bariatric surgery 4/19/24    Screening for HIV (human immunodeficiency virus)  - HIV Antigen Antibody Combo    Need for hepatitis C screening test  - Hepatitis C Screen Reflex to HCV RNA Quant and Genotype            BMI  Estimated body mass index is 36.37 kg/m  as calculated from the following:    Height as of this encounter: 1.765 m (5' 9.5\").    Weight as of this encounter: 113.4 kg (249 lb 14.4 oz).   Weight management plan: Recent weight loss surgery          Mana Pennington is a 29 year old, presenting for the following health issues:  RECHECK (Pt concerned her IUD may have fallen out. Pt states she had vaginal bleeding, stated the strings grew in length initially, now does not see the strings.)      9/4/2024     4:37 PM   Additional Questions   Roomed by Callum GARCIA RN   Accompanied by Carrie     History of Present Illness       Reason for visit:  Iud concerns   She is taking medications regularly.     Histoy of heavy bleeding has used hormonal IUDs to help control this.  Recent tricuspid valve replacement is currently on warfarin.  Most recent cycle had heavy " "menstrual bleeding with clots.  Unable to locate her IUD strings and is concerned that it came out with one of the large clots that she had.  Reports that she has had previous IUDs expelled.     Saw CV surgery earlier today with concerns of her incision.      Review of Systems  Constitutional, HEENT, cardiovascular, pulmonary, gi and gu systems are negative, except as otherwise noted.      Objective    /62 (BP Location: Left arm, Patient Position: Sitting, Cuff Size: Adult Regular)   Pulse 88   Temp 98  F (36.7  C) (Tympanic)   Resp 17   Ht 1.765 m (5' 9.5\")   Wt 113.4 kg (249 lb 14.4 oz)   LMP  (LMP Unknown)   SpO2 98%   BMI 36.37 kg/m    Body mass index is 36.37 kg/m .    Physical Exam   GENERAL: alert and no distress  RESP: lungs clear to auscultation - no rales, rhonchi or wheezes  CV: regular rate and rhythm   (female): normal female external genitalia, normal urethral meatus, normal vaginal mucosa, no IUD strings visualized  PSYCH: mentation appears normal, affect normal/bright            Signed Electronically by: LAWANDA Velazquez CNP    "

## 2024-09-05 LAB
HCV AB SERPL QL IA: NONREACTIVE
HIV 1+2 AB+HIV1 P24 AG SERPL QL IA: NONREACTIVE
TSH SERPL DL<=0.005 MIU/L-ACNC: 2.07 UIU/ML (ref 0.3–4.2)

## 2024-09-06 ENCOUNTER — OFFICE VISIT (OUTPATIENT)
Dept: MIDWIFE SERVICES | Facility: CLINIC | Age: 29
End: 2024-09-06
Payer: MEDICAID

## 2024-09-06 ENCOUNTER — LAB (OUTPATIENT)
Dept: LAB | Facility: CLINIC | Age: 29
End: 2024-09-06
Payer: MEDICAID

## 2024-09-06 ENCOUNTER — ANTICOAGULATION THERAPY VISIT (OUTPATIENT)
Dept: ANTICOAGULATION | Facility: CLINIC | Age: 29
End: 2024-09-06

## 2024-09-06 ENCOUNTER — HOSPITAL ENCOUNTER (OUTPATIENT)
Dept: ULTRASOUND IMAGING | Facility: CLINIC | Age: 29
Discharge: HOME | End: 2024-09-06
Payer: COMMERCIAL

## 2024-09-06 VITALS
SYSTOLIC BLOOD PRESSURE: 98 MMHG | DIASTOLIC BLOOD PRESSURE: 60 MMHG | WEIGHT: 249 LBS | BODY MASS INDEX: 35.65 KG/M2 | HEART RATE: 80 BPM | HEIGHT: 70 IN

## 2024-09-06 DIAGNOSIS — T83.32XA INTRAUTERINE CONTRACEPTIVE DEVICE THREADS LOST, INITIAL ENCOUNTER: ICD-10-CM

## 2024-09-06 DIAGNOSIS — Z30.430 ENCOUNTER FOR INSERTION OF INTRAUTERINE CONTRACEPTIVE DEVICE: Primary | ICD-10-CM

## 2024-09-06 DIAGNOSIS — Z95.4 S/P TRICUSPID VALVE REPLACEMENT: ICD-10-CM

## 2024-09-06 DIAGNOSIS — N92.0 MENORRHAGIA WITH REGULAR CYCLE: ICD-10-CM

## 2024-09-06 DIAGNOSIS — Z97.5 IUD (INTRAUTERINE DEVICE) IN PLACE: ICD-10-CM

## 2024-09-06 DIAGNOSIS — Z95.4 S/P TRICUSPID VALVE REPLACEMENT: Primary | ICD-10-CM

## 2024-09-06 PROBLEM — O09.73 SUPERVISION OF HIGH RISK PREGNANCY DUE TO SOCIAL PROBLEMS IN THIRD TRIMESTER: Status: RESOLVED | Noted: 2017-07-28 | Resolved: 2024-09-06

## 2024-09-06 PROBLEM — Z79.4 ENCOUNTER FOR LONG-TERM (CURRENT) USE OF INSULIN (H): Status: ACTIVE | Noted: 2018-07-13

## 2024-09-06 LAB
HCG UR QL: NEGATIVE
INR BLD: 1.5 (ref 0.9–1.1)

## 2024-09-06 PROCEDURE — 76830 TRANSVAGINAL US NON-OB: CPT | Mod: 26 | Performed by: RADIOLOGY

## 2024-09-06 PROCEDURE — 76856 US EXAM PELVIC COMPLETE: CPT

## 2024-09-06 PROCEDURE — 36416 COLLJ CAPILLARY BLOOD SPEC: CPT

## 2024-09-06 PROCEDURE — 81025 URINE PREGNANCY TEST: CPT | Performed by: ADVANCED PRACTICE MIDWIFE

## 2024-09-06 PROCEDURE — 85610 PROTHROMBIN TIME: CPT

## 2024-09-06 PROCEDURE — 58300 INSERT INTRAUTERINE DEVICE: CPT | Performed by: ADVANCED PRACTICE MIDWIFE

## 2024-09-06 PROCEDURE — 76856 US EXAM PELVIC COMPLETE: CPT | Mod: 26 | Performed by: RADIOLOGY

## 2024-09-06 PROCEDURE — 76830 TRANSVAGINAL US NON-OB: CPT

## 2024-09-06 NOTE — PROGRESS NOTES
"IUD Insertion:  CONSULT:    Is a pregnancy test required: Yes.  Was it positive or negative?  Negative  Was a consent obtained?  Yes    Subjective: Aditi Lei is a 29 year old  presents for IUD and desires Mirena type IUD. Reports expulsion about 1 year after placement, continues with a heavy flow to cycle and may be related.     Patient has been given the opportunity to ask questions about all forms of birth control, including all options appropriate for Aditi Lei. Discussed that no method of birth control, except abstinence is 100% effective against pregnancy or sexually transmitted infection.     Aditi Lei understands she may have the IUD removed at any time. IUD should be removed by a health care provider.    The entire insertion procedure was reviewed with the patient, including care after placement.    Patient's last menstrual period was 2024 (within days). Last sexual activity: > 2 weeks ago . No allergy to betadine or shellfish. Patient declines STD screening  hCG Urine Qualitative   Date Value Ref Range Status   2024 Negative Negative Final     Comment:     This test is for screening purposes.  Results should be interpreted along with the clinical picture.  Confirmation testing is available if warranted by ordering [id number], HCG Quantitative Pregnancy.         BP 98/60 (BP Location: Right arm, Patient Position: Sitting, Cuff Size: Adult Large)   Pulse 80   Ht 1.765 m (5' 9.5\")   Wt 112.9 kg (249 lb)   LMP 2024 (Within Days)   BMI 36.24 kg/m      Pelvic Exam:   EG/BUS: normal genital architecture without lesions, erythema or abnormal secretions.   Vagina: moist, pink, rugae with physiologic discharge and secretions  Cervix: parous no lesions and pink, moist, closed, without lesion or CMT  Uterus: mid position, mobile, no pain  Adnexa: within normal limits and no masses, nodularity, tenderness    PROCEDURE NOTE: -- IUD Insertion    Reason for Insertion: contraception and " abnormal uterine bleeding    Premedicated with ibuprofen.  Under sterile technique, cervix was visualized with speculum and prepped with Betadine solution swab x 3.   The uterus was gently sounded to 8.5 cm. IUD prepared for placement, and IUD inserted according to 's instructions without difficulty or significant resitance, and deployed at the fundus. The strings were visualized and trimmed to 3.0 cm from the external os. Speculum removed.  Patient tolerated procedure well.    EBL: minimal    Complications: none    ASSESSMENT:     ICD-10-CM    1. Encounter for insertion of intrauterine contraceptive device  Z30.430 levonorgestrel (MIRENA) 52 MG (20 mcg/day) IUD 1 each     INSERTION INTRAUTERINE DEVICE     HCG Qual, Urine ([id number])     HCG Qual, Urine ([id number])      2. Menorrhagia with regular cycle  N92.0 Ob/Gyn  Referral      3. Intrauterine contraceptive device threads lost, initial encounter  T83.32XA Ob/Gyn  Referral      4. IUD (intrauterine device) in place  Z97.5              PLAN:    Given 's handouts, including when to have IUD removed, list of danger s/sx, side effects and follow up recommended. Encouraged condom use for prevention of STD. Back up contraception advised for 7 days if progestin method. Advised to call for any fever, for prolonged or severe pain or bleeding, abnormal vaginal discharge, or unable to palpate strings. She was advised to use pain medications (ibuprofen) as needed for mild to moderate pain. Advised to follow-up in clinic in 4-6 weeks for IUD string check if unable to find strings or as directed by provider.     Holly Moore CNM

## 2024-09-06 NOTE — PROGRESS NOTES
ANTICOAGULATION MANAGEMENT     Aditi Lei 29 year old female is on warfarin with subtherapeutic INR result. (Goal INR 2.0-3.0)    Recent labs: (last 7 days)     09/06/24  0909   INR 1.5*       ASSESSMENT     Warfarin Lab Questionnaire    Warfarin Doses Last 7 Days      9/6/2024     9:00 AM   Dose in Tablet or Mg   TAB or MG? milligram (mg)     Pt Rptd Dose SUNDAY MONDAY TUESDAY WED THURS FRIDAY SATURDAY 9/6/2024   9:00 AM 7.5 7.5 7.5 7.5 7.5 7.5 7.5         9/6/2024   Warfarin Lab Questionnaire   Missed doses within past 14 days? No   Changes in diet or alcohol within past 14 days? No   Medication changes since last result? No   Injuries or illness since last result? No   New shortness of breath, severe headaches or sudden changes in vision since last result? No   Abnormal bleeding since last result? No   Upcoming surgery, procedure? No   Best number to call with results? [phone]        Previous result: Subtherapeutic  Additional findings: Recent heart valve replacement in last 10 weeks and US today to confirm lost iud. Aditi believes it is gone and bleeding has stopped.          PLAN     Recommended plan for no diet, medication or health factor changes affecting INR     Dosing Instructions: Increase your warfarin dose (25% change) with next INR in 4 days       Summary  As of 9/6/2024      Full warfarin instructions:  7.5 mg every Sun, Tue, Thu; 10 mg all other days   Next INR check:  9/9/2024               Telephone call with Aditi who verbalizes understanding and agrees to plan    Lab visit scheduled    Education provided: Please call back if any changes to your diet, medications or how you've been taking warfarin    Plan made per [facility] anticoagulation protocol    Nilson Murillo, DOMENIC  Anticoagulation Clinic  9/6/2024    _______________________________________________________________________     Anticoagulation Episode Summary       Current INR goal:  2.0-3.0   TTR:  0.0% (4 d)   Target end date:  Indefinite    Send INR reminders to:  [facility]    Indications    S/P tricuspid valve replacement [Z95.4]             Comments:  33 mm Epic Plus             Anticoagulation Care Providers       Provider Role Specialty Phone number    Kandi Mccallum APRN CNP Referring Nurse Practitioner Primary Care [phone]

## 2024-09-09 ENCOUNTER — LAB (OUTPATIENT)
Dept: LAB | Facility: CLINIC | Age: 29
End: 2024-09-09
Payer: MEDICAID

## 2024-09-09 ENCOUNTER — OFFICE VISIT (OUTPATIENT)
Dept: FAMILY MEDICINE | Facility: CLINIC | Age: 29
End: 2024-09-09
Payer: MEDICAID

## 2024-09-09 ENCOUNTER — ANTICOAGULATION THERAPY VISIT (OUTPATIENT)
Dept: ANTICOAGULATION | Facility: CLINIC | Age: 29
End: 2024-09-09

## 2024-09-09 VITALS
SYSTOLIC BLOOD PRESSURE: 106 MMHG | DIASTOLIC BLOOD PRESSURE: 64 MMHG | RESPIRATION RATE: 19 BRPM | BODY MASS INDEX: 35.37 KG/M2 | WEIGHT: 247.1 LBS | HEIGHT: 70 IN | HEART RATE: 99 BPM | TEMPERATURE: 98.2 F | OXYGEN SATURATION: 96 %

## 2024-09-09 DIAGNOSIS — Z13.220 SCREENING FOR LIPID DISORDERS: ICD-10-CM

## 2024-09-09 DIAGNOSIS — Z97.5 IUD (INTRAUTERINE DEVICE) IN PLACE: ICD-10-CM

## 2024-09-09 DIAGNOSIS — Z95.4 S/P TRICUSPID VALVE REPLACEMENT: ICD-10-CM

## 2024-09-09 DIAGNOSIS — Z95.4 S/P TRICUSPID VALVE REPLACEMENT: Primary | ICD-10-CM

## 2024-09-09 DIAGNOSIS — E66.01 CLASS 2 SEVERE OBESITY DUE TO EXCESS CALORIES WITH SERIOUS COMORBIDITY AND BODY MASS INDEX (BMI) OF 35.0 TO 35.9 IN ADULT (H): ICD-10-CM

## 2024-09-09 DIAGNOSIS — E66.812 CLASS 2 SEVERE OBESITY DUE TO EXCESS CALORIES WITH SERIOUS COMORBIDITY AND BODY MASS INDEX (BMI) OF 35.0 TO 35.9 IN ADULT (H): ICD-10-CM

## 2024-09-09 DIAGNOSIS — I50.32 CHRONIC HEART FAILURE WITH PRESERVED EJECTION FRACTION (HFPEF) (H): ICD-10-CM

## 2024-09-09 LAB — INR BLD: 1.7 (ref 0.9–1.1)

## 2024-09-09 PROCEDURE — 90472 IMMUNIZATION ADMIN EACH ADD: CPT

## 2024-09-09 PROCEDURE — 90677 PCV20 VACCINE IM: CPT

## 2024-09-09 PROCEDURE — 90471 IMMUNIZATION ADMIN: CPT

## 2024-09-09 PROCEDURE — 36416 COLLJ CAPILLARY BLOOD SPEC: CPT

## 2024-09-09 PROCEDURE — 85610 PROTHROMBIN TIME: CPT

## 2024-09-09 PROCEDURE — 90656 IIV3 VACC NO PRSV 0.5 ML IM: CPT

## 2024-09-09 PROCEDURE — 99214 OFFICE O/P EST MOD 30 MIN: CPT | Mod: 25

## 2024-09-09 NOTE — PROGRESS NOTES
ANTICOAGULATION MANAGEMENT     Aditi ARAMBULA Quique 29 year old female is on warfarin with subtherapeutic INR result. (Goal INR 2.0-3.0)    Recent labs: (last 7 days)     09/09/24  0858   INR 1.7*       ASSESSMENT     Warfarin Lab Questionnaire    Warfarin Doses Last 7 Days      9/9/2024     8:52 AM   Dose in Tablet or Mg   TAB or MG? milligram (mg)     Pt Rptd Dose SUNDAY MONDAY TUESDAY WED THURS FRIDAY SATURDAY 9/9/2024   8:52 AM 7.5 7.5 7.5 7.5 7.5 10 10         9/9/2024   Warfarin Lab Questionnaire   Missed doses within past 14 days? No   Changes in diet or alcohol within past 14 days? No   Medication changes since last result? Yes   Please list: not taking antibiotics since yesterday morning   Injuries or illness since last result? No   New shortness of breath, severe headaches or sudden changes in vision since last result? No   Abnormal bleeding since last result? No   Upcoming surgery, procedure? No   Best number to call with results? [phone]        Previous result: Subtherapeutic  Additional findings: None IUD is replaced, no bleeding sx       PLAN     Recommended plan for no diet, medication or health factor changes affecting INR     Dosing Instructions: Continue your current warfarin dose ( was increased 3 days ago) with next INR in 3 days       Summary  As of 9/9/2024      Full warfarin instructions:  7.5 mg every Sun, Tue, Thu; 10 mg all other days   Next INR check:  9/13/2024               Telephone call with Aditi who verbalizes understanding and agrees to plan    Lab visit scheduled    Education provided: Please call back if any changes to your diet, medications or how you've been taking warfarin    Plan made per [facility] anticoagulation protocol    Nilson Murillo RN  Anticoagulation Clinic  9/9/2024    _______________________________________________________________________     Anticoagulation Episode Summary       Current INR goal:  2.0-3.0   TTR:  0.0% (1 wk)   Target end date:  Indefinite   Send INR  reminders to:  [facility]    Indications    S/P tricuspid valve replacement [Z95.4]             Comments:  33 mm Epic Plus             Anticoagulation Care Providers       Provider Role Specialty Phone number    Kandi Mccallum APRN CNP Referring Nurse Practitioner Primary Care [phone]

## 2024-09-09 NOTE — NURSING NOTE
Prior to immunization administration, verified patients identity using patient s name and date of birth. Please see Immunization Activity for additional information.     Screening Questionnaire for Adult Immunization    Are you sick today?   No   Do you have allergies to medications, food, a vaccine component or latex?   No   Have you ever had a serious reaction after receiving a vaccination?   No   Do you have a long-term health problem with heart, lung, kidney, or metabolic disease (e.g., diabetes), asthma, a blood disorder, no spleen, complement component deficiency, a cochlear implant, or a spinal fluid leak?  Are you on long-term aspirin therapy?   No   Do you have cancer, leukemia, HIV/AIDS, or any other immune system problem?   No   Do you have a parent, brother, or sister with an immune system problem?   No   In the past 3 months, have you taken medications that affect  your immune system, such as prednisone, other steroids, or anticancer drugs; drugs for the treatment of rheumatoid arthritis, Crohn s disease, or psoriasis; or have you had radiation treatments?   No   Have you had a seizure, or a brain or other nervous system problem?   No   During the past year, have you received a transfusion of blood or blood    products, or been given immune (gamma) globulin or antiviral drug?   No   For women: Are you pregnant or is there a chance you could become       pregnant during the next month?   No   Have you received any vaccinations in the past 4 weeks?   No     Immunization questionnaire answers were all negative.      Patient instructed to remain in clinic for 15 minutes afterwards, and to report any adverse reactions.     Screening performed by Lu Albarran CMA on 9/9/2024 at 9:43 AM.

## 2024-09-09 NOTE — PROGRESS NOTES
Assessment & Plan     S/P tricuspid valve replacement  Doing well. Incision healing. Has no more scheduled follow up plans with CV surg. Continue tylenol and methocarbamol as needed for pain. She is asking surgeon if can switch to eliquis for anticoagulation as she has tolerated this previously and would like to visit her family in [place] without needing frequent INR checks. Continue warfarin for now and will discontinue warfarin if they are agreeable to starting eliquis.     Chronic heart failure with preserved ejection fraction (HFpEF) (H)  Continues on torsemide per cardiology. Has appointment with them this coming September.     Class 2 severe obesity due to excess calories with serious comorbidity and body mass index (BMI) of 35.0 to 35.9 in adult (H)  S/p bariatric surgery. Has been eating healthy and steadily losing weight.     IUD (intrauterine device) in place  Replaced after recent heavy menstrual cycle where IUD was expelled.     Screening for lipid disorders  - Lipid panel reflex to direct LDL Non-fasting        MED REC REQUIRED{Post Medication Reconciliation Status: discharge medications reconciled, continue medications without change    Plan to follow up in 3 months.       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    Planned for tricuspid valve replacement, which she returned for electively on 8/15/24.   Tricuspid Valve Replacement (Epic Plus 33 mm) and placement of temporary ventricular and atrial pacing wires with Dr. Mcclain on 8/14/2024.   Recommended anticoagulation following this operation is with daily aspirin (may discontinue when INR therapeutic) plus warfarin (for 3 months, INR goal 2-2). When 3 months of warfarin are complete, please resume ASA 81 mg daily.     Has been taking 10 mg of Coumadin.  INR not within goal.  Has a recheck later this week. Is asking cardiology if she is able to switch to eliquis as this has worked better for her previously with DVT.     Saw CV surgery 8/29 and treated for incisional infection. Saw them again for recheck on 9/4. Last antibiotic yesterday morning.     Did not tolerate beta blocker that she had been started on.      Pain: at night worse.   Just taking tylenol, has not tried the muscle relaxer.     S/p bariatic surgery. Losing weight and eating healthy    New IUD after previous one was expelled.    Has not heard from cardiac rehab.   Sees cardiology through [facility] on September 17th.        Review of Systems  CONSTITUTIONAL: NEGATIVE for fever, chills, change in weight  INTEGUMENTARY/SKIN: scab sternal incision  RESP: NEGATIVE for significant cough or SOB  CV: NEGATIVE for chest pain, palpitations or peripheral edema  GI: NEGATIVE for nausea, abdominal pain, heartburn, or change in bowel habits  : NEGATIVE for frequency, dysuria, or hematuria  MUSCULOSKELETAL: NEGATIVE for significant arthralgias or myalgia  NEURO: NEGATIVE for weakness, dizziness or paresthesias  ENDOCRINE: NEGATIVE for temperature intolerance, skin/hair changes  PSYCHIATRIC: NEGATIVE for changes in mood or affect      Objective    /64 (BP Location: Left arm, Patient Position: Sitting, Cuff Size: Adult Regular)   Pulse 99   Temp 98.2  F (36.8  C) (Tympanic)   Resp 19   Ht  "1.765 m (5' 9.5\")   Wt 112.1 kg (247 lb 1.6 oz)   LMP 08/21/2024 (Within Days)   SpO2 96%   BMI 35.97 kg/m    Body mass index is 35.97 kg/m .    Physical Exam   GENERAL: alert and no distress  RESP: lungs clear to auscultation - no rales, rhonchi or wheezes  CV: regular rate and rhythm, normal S1 S2, no S3 or S4, no murmur, click or rub, no peripheral edema  ABDOMEN: soft, nontender, no hepatosplenomegaly, no masses and bowel sounds normal  MS: no gross musculoskeletal defects noted, no edema  SKIN: Sternal incision healing with small scab on inferior aspect. No drainage, erythema or edema.    PSYCH: mentation appears normal, affect normal/bright          Signed Electronically by: LAWANDA Velazquez CNP    "

## 2024-09-17 ENCOUNTER — DOCUMENTATION ONLY (OUTPATIENT)
Dept: ANTICOAGULATION | Facility: CLINIC | Age: 29
End: 2024-09-17
Payer: COMMERCIAL

## 2024-09-17 NOTE — PROGRESS NOTES
ANTICOAGULATION  MANAGEMENT    Aditi Lei is being discharged from the [facility] Anticoagulation Management Program ([facility]).    Reason for discharge: warfarin replaced by alternate therapy, eliquis    Anticoagulation episode resolved, ACC referral closed, and Standing order discontinued    If patient needs warfarin management in the future, please send a new referral    Wanda Tellez RN

## 2024-09-30 DIAGNOSIS — Z95.4 S/P TRICUSPID VALVE REPLACEMENT: ICD-10-CM

## 2024-09-30 RX ORDER — WARFARIN SODIUM 5 MG/1
5 TABLET ORAL DAILY
Qty: 30 TABLET | Refills: 0 | Status: CANCELLED | OUTPATIENT
Start: 2024-09-30

## 2024-10-21 ENCOUNTER — MYC MEDICAL ADVICE (OUTPATIENT)
Dept: MIDWIFE SERVICES | Facility: CLINIC | Age: 29
End: 2024-10-21
Payer: COMMERCIAL

## 2024-10-21 DIAGNOSIS — Z30.09 ENCOUNTER FOR GENERAL COUNSELING AND ADVICE ON CONTRACEPTIVE MANAGEMENT: Primary | ICD-10-CM

## 2024-10-21 DIAGNOSIS — Z97.5 IUD (INTRAUTERINE DEVICE) IN PLACE: ICD-10-CM

## 2024-10-21 DIAGNOSIS — I50.32 CHRONIC HEART FAILURE WITH PRESERVED EJECTION FRACTION (HFPEF) (H): ICD-10-CM

## 2024-10-21 DIAGNOSIS — T83.89XA: ICD-10-CM

## 2024-10-23 ENCOUNTER — MYC MEDICAL ADVICE (OUTPATIENT)
Dept: FAMILY MEDICINE | Facility: CLINIC | Age: 29
End: 2024-10-23
Payer: COMMERCIAL

## 2024-10-23 DIAGNOSIS — Z30.011 ENCOUNTER FOR INITIAL PRESCRIPTION OF CONTRACEPTIVE PILLS: Primary | ICD-10-CM

## 2024-10-23 PROBLEM — T83.89XA: Status: ACTIVE | Noted: 2024-10-23

## 2024-10-23 RX ORDER — ACETAMINOPHEN AND CODEINE PHOSPHATE 120; 12 MG/5ML; MG/5ML
0.35 SOLUTION ORAL DAILY
Qty: 28 TABLET | Refills: 11 | Status: SHIPPED | OUTPATIENT
Start: 2024-10-23

## 2024-10-23 NOTE — TELEPHONE ENCOUNTER
See other [employer] message documentation for plan of care. Rx provided for micronor based on pt health history and contraindication for combined hormone contraception.

## 2024-10-23 NOTE — TELEPHONE ENCOUNTER
TC: attempted to reach Aditi by phone and left VM to call back. Review of chart and pt update on recent IUD expulsion; recommend considering appt with complex family planning clinic at [facility]. Given written information on scheduling with their team. Referral provided via Epic.

## 2024-11-26 NOTE — PROGRESS NOTES
"[facility] Gynecology Visit    Reason for Referral: Contraception counselling  Referring Provider: Holly Moore CNM    SUBJECTIVE     HPI:    Aditi Lei is a 29 year old  with a PMH tricuspid valve endocarditis, tricuspid valve replacement 2024 on anticoagulation (wafarin), and HFpEF here for contraception counseling. She had a Mirena IUD inserted on 24. She is being referred to the [facility] clinic due to recent expulsion 1.5- 2 months ago. Strings were elongated and then strings disappeared (did not see device but has had other expulsions). Desnies abdominal pain fevers chills.     At least three prior experiences with expulsion - states would \"last about a year\" typically and then would have cramping and IUD would pass. She strongly desires pregnancy prevention and a contraceptive method which will lessen duration of menses. Her menses \"last for months\" and she frequently soaks through pads and tampons with passing clots. Mirena IUD previously well tolerated prior to expulsions. She desires contraceptive method she does not need to take at the same time everyday and will not cause weight gain.     GYN History  - LMP: No LMP recorded. (Menstrual status: IUD).  - Menses: See HPI  - Pap Smears: 2022 ASCUS HPV neg   - Sexual Activity/Concerns: 2 male partners. No concerns.  - Hx STIs/UTIs: Syphillis in the last years.     Obstetric History  OB History    Para Term  AB Living   3 3 2 1 0 3   SAB IAB Ectopic Multiple Live Births   0 0 0 0 3      # Outcome Date GA Lbr Artemio/2nd Weight Sex Type Anes PTL Lv   3 Term 22 37w1d 04:06 / 00:10 2.684 kg (5 lb 14.7 oz) M Vag-Spont EPI N MANAN      Name: JAMES LEI      Apgar1: 8  Apgar5: 9   2  17 35w0d  2.551 kg (5 lb 10 oz) M Vag-Spont   MANAN      Birth Comments: exposed to heroin during pregnancy      Complications: Intravenous drug user      " Name: David      Apgar1: 8  Apgar5: 8   1 Term 05/28/16 40w0d  3.033 kg (6 lb 11 oz) F Vag-Spont EPI N MANAN      Birth Comments: exposed to heroin during pregnancy      Name: Twin       Past Medical History  Past Medical History:   Diagnosis Date    Heart failure with preserved ejection fraction, NYHA class I (H)     Hiatal hernia 04/2024    Infective endocarditis of tricuspid valve     Tricuspid valve regurgitation        Past Surgical History  Past Surgical History:   Procedure Laterality Date    AS GASTROPLASTY DUODENAL SWITCH  04/2024    CV TRANSCATHETER TRICUSPID VALVE REPLACEMENT - FEMORAL APPROACH  08/2024    PILONIDAL CYST DRAINAGE  2012       Medications  Current Outpatient Medications   Medication Sig Dispense Refill    calcium citrate (CITRACAL) 950 (200 Ca) MG tablet Take 1 tablet by mouth 2 times daily      levonorgestrel (KYLEENA) 19.5 MG IUD 1 each by Intrauterine route once.      magnesium oxide (MAG-OX) 400 MG tablet Take 400 mg by mouth daily      methocarbamol (ROBAXIN) 500 MG tablet Take 500 mg by mouth 3 times daily as needed for muscle spasms      Methylcobalamin 1000 MCG SUBL Place 1,000 mcg under the tongue daily      norethindrone (MICRONOR) 0.35 MG tablet Take 1 tablet (0.35 mg) by mouth daily. 28 tablet 11    Pediatric Multiple Vitamins (FLINTSTONES PLUS EXTRA C) CHEW Take 2 tablets by mouth 2 times daily      potassium gluconate 2.5 MEQ tablet Take 2.5 mEq by mouth      torsemide (DEMADEX) 20 MG tablet Take 20 mg by mouth every morning      vitamin D3 (CHOLECALCIFEROL) 125 MCG (5000 UT) tablet Take 125 mcg by mouth daily      warfarin ANTICOAGULANT (COUMADIN) 5 MG tablet Take 1 tablet (5 mg) by mouth daily. 30 tablet 0    levonorgestrel (MIRENA) 52 MG (20 mcg/day) IUD 1 each by Intrauterine route once. Mirena IUD placed on 9/06/24.  Due for removal/replacement by 9/06/29.  LOT: [id number]  Exp: 08/2026  NDC: 34643-042-92       Current Facility-Administered Medications   Medication Dose  Route Frequency Provider Last Rate Last Admin    levonorgestrel (KYLEENA) 19.5 MG IUD 1 each  1 each Intrauterine Once          Allergies   No Known Allergies    Social History  Social History     Tobacco Use    Smoking status: Former     Current packs/day: 0.00     Average packs/day: 1 pack/day for 5.0 years (5.0 ttl pk-yrs)     Types: Cigarettes     Quit date: 2013     Years since quittin.9    Smokeless tobacco: Never   Vaping Use    Vaping status: Never Used     Family History  No family history on file.    ROS: 10-Point ROS negative except as noted in HPI    OBJECTIVE     Physical Exam  /88   Pulse 82   Wt 105.7 kg (233 lb)   BMI 33.91 kg/m    Gen: Well-appearing, NAD  HEENT: Normocephalic, atraumatic  Neck: Thyroid is not enlarged, no appreciable masses palpated. Non-tender  CV:  RRR, no m/r/g auscultated  Pulm: CTAB, no w/r/r auscultated  Abd: Soft, non-tender, non-distended  Ext: No LE edema, extremities warm and well perfused  Pelvic:  Normal appearing external female genitalia. Normal hair distribution. Vagina is without lesions. Physiologic discharge. Cervix multiparous, no lesions, no cervical motion tenderness. Uterus is small, mobile, non-tender. No adnexal tenderness or masses/      ASSESSMENT & PLAN     Aditi Lei is a 29 year old  female here for contraception counseling in the setting of valvular heart disease complicated by HFpEF.  Current methods of contraception contraindicated include OCPs. However, other forms of contraception are not contraindicated. Discussed the methods of contraception available including insertion of another hormonal/non hormonal IUD, Levonorgestrel implant (Nexplanon), depot medroxyprogesterone acetate injection, contraception ring. Due to possible side effect of weight gain and unpredictable effect on menses, she did not elect for the levonorgestrel implant at this time. Due to desire for menstrual regularity and reduction in menstrual flow,  she strongly desired placement of another hormonal IUD. Because she had only experienced expulsions with Mirena IUD devices, it may be warranted to try a smaller frame device such as the Kyleena. Counseled the patient that there is no data to suggest that risk of expulsion would be lower with the Kyleena vs Mirena and that if expulsion would occur again, we would recommend another method of contraception. She agreed with the plan to try Kyleena IUD and if expulsion occurred again to return to clinic for insertion of Nexplanon.      PROCEDURE NOTE: -- IUD Insertion    Reason for Insertion: contraception and abnormal uterine bleeding    None- previously well tolerated without medication  Under sterile technique, cervix was visualized with speculum and prepped with Betadine solution swab x 3. Tenaculum was placed for stability. The uterus was gently straightened and sounded to 9.0 cm. IUD prepared for placement, and IUD inserted according to 's instructions without difficulty or significant resitance, and deployed at the fundus. The strings were visualized and trimmed to 2.0 cm from the external os. Tenaculum was removed and hemostasis noted. Speculum removed.  Patient tolerated procedure well.    EBL: minimal    Complications: none    Lot #: [id number]  Exp Date: 12/2026  Expected Removal Date: 11/2029      PLAN:    Given 's handouts, including when to have IUD removed, list of danger s/sx, side effects and follow up recommended. Encouraged condom use for prevention of STD. Back up contraception advised for 7 days if progestin method. Advised to call for any fever, for prolonged or severe pain or bleeding, abnormal vaginal discharge, or unable to palpate strings. She was advised to use pain medications (ibuprofen) as needed for mild to moderate pain. Advised to follow-up in clinic in 4-6 weeks for IUD string check if unable to find strings or as directed by provider.     Patient seen and discussed  with Dr. Mita Carranza MD  Obstetrics, Gynecology & Women's Health   Resident, PGY-1  11/27/2024 10:06 AM

## 2024-11-27 ENCOUNTER — OFFICE VISIT (OUTPATIENT)
Dept: OBGYN | Facility: CLINIC | Age: 29
End: 2024-11-27
Attending: ADVANCED PRACTICE MIDWIFE
Payer: COMMERCIAL

## 2024-11-27 VITALS
BODY MASS INDEX: 33.91 KG/M2 | SYSTOLIC BLOOD PRESSURE: 119 MMHG | DIASTOLIC BLOOD PRESSURE: 88 MMHG | WEIGHT: 233 LBS | HEART RATE: 82 BPM

## 2024-11-27 DIAGNOSIS — I50.32 CHRONIC HEART FAILURE WITH PRESERVED EJECTION FRACTION (HFPEF) (H): ICD-10-CM

## 2024-11-27 DIAGNOSIS — Z30.430 ENCOUNTER FOR INSERTION OF INTRAUTERINE CONTRACEPTIVE DEVICE: Primary | ICD-10-CM

## 2024-11-27 DIAGNOSIS — T83.89XA: ICD-10-CM

## 2024-11-27 DIAGNOSIS — Z30.09 ENCOUNTER FOR GENERAL COUNSELING AND ADVICE ON CONTRACEPTIVE MANAGEMENT: ICD-10-CM

## 2024-11-27 PROCEDURE — 58300 INSERT INTRAUTERINE DEVICE: CPT | Performed by: OBSTETRICS & GYNECOLOGY

## 2024-11-27 PROCEDURE — G0463 HOSPITAL OUTPT CLINIC VISIT: HCPCS | Performed by: OBSTETRICS & GYNECOLOGY

## 2024-11-27 NOTE — NURSING NOTE
Chief Complaint   Patient presents with    Establish Care     C/O IUD issues    Chelita Cotton LPN

## 2024-11-27 NOTE — Clinical Note
"2024       RE: Aditi Lei  [address]  [place] [address]     Dear Colleague,    Thank you for referring your patient, Aditi Lei, to the [facility]. Please see a copy of my visit note below.    [facility]  New Gynecology Visit    Reason for Referral: Contraception counselling  Referring Provider: Holly Moore CNM    SUBJECTIVE     HPI:    Aditi Lei is a 29 year old  with a PMH tricuspid valve endocarditis, tricuspid valve replacement 2024 on anticoagulation (wafarin), and HFpEF here for contraception counseling. She had a Mirena IUD inserted on 24. She is being referred to the [facility] clinic due to recent expulsion 1.5- 2 months ago. Strings were elongated and then strings disappeared (did not see device but has had other expulsions). Desnies abdominal pain fevers chills.     At least three prior experiences with expulsion - states would \"last about a year\" typically and then would have cramping and IUD would pass. She strongly desires pregnancy prevention and a contraceptive method which will lessen duration of menses. Her menses \"last for months\" and she frequently soaks through pads and tampons with passing clots. Mirena IUD previously well tolerated prior to expulsions. She desires contraceptive method she does not need to take at the same time everyday and will not cause weight gain.     GYN History  - LMP: No LMP recorded. (Menstrual status: IUD).  - Menses: See HPI  - Pap Smears: 2022 ASCUS HPV neg   - Sexual Activity/Concerns: 2 male partners. No concerns.  - Hx STIs/UTIs: Syphillis in the last years.     Obstetric History  OB History    Para Term  AB Living   3 3 2 1 0 3   SAB IAB Ectopic Multiple Live Births   0 0 0 0 3      # Outcome Date GA Lbr Artemio/2nd Weight Sex Type Anes PTL Lv   3 " Term 22 37w1d 04:06 / 00:10 2.684 kg (5 lb 14.7 oz) M Vag-Spont EPI N MANAN      Name: JAMES FOSTER      Apgar1: 8  Apgar5: 9   2  17 35w0d  2.551 kg (5 lb 10 oz) M Vag-Spont   MANAN      Birth Comments: exposed to heroin during pregnancy      Complications: Intravenous drug user      Name: David      Apgar1: 8  Apgar5: 8   1 Term 16 40w0d  3.033 kg (6 lb 11 oz) F Vag-Spont EPI N MANAN      Birth Comments: exposed to heroin during pregnancy      Name: Twin       Past Medical History  Past Medical History:   Diagnosis Date    Heart failure with preserved ejection fraction, NYHA class I (H)     Hiatal hernia 2024    Infective endocarditis of tricuspid valve     Tricuspid valve regurgitation        Past Surgical History  Past Surgical History:   Procedure Laterality Date    AS GASTROPLASTY DUODENAL SWITCH  2024    CV TRANSCATHETER TRICUSPID VALVE REPLACEMENT - FEMORAL APPROACH  2024    PILONIDAL CYST DRAINAGE  2012       Medications  Current Outpatient Medications   Medication Sig Dispense Refill    calcium citrate (CITRACAL) 950 (200 Ca) MG tablet Take 1 tablet by mouth 2 times daily      levonorgestrel (KYLEENA) 19.5 MG IUD 1 each by Intrauterine route once.      magnesium oxide (MAG-OX) 400 MG tablet Take 400 mg by mouth daily      methocarbamol (ROBAXIN) 500 MG tablet Take 500 mg by mouth 3 times daily as needed for muscle spasms      Methylcobalamin 1000 MCG SUBL Place 1,000 mcg under the tongue daily      norethindrone (MICRONOR) 0.35 MG tablet Take 1 tablet (0.35 mg) by mouth daily. 28 tablet 11    Pediatric Multiple Vitamins (FLINTSTONES PLUS EXTRA C) CHEW Take 2 tablets by mouth 2 times daily      potassium gluconate 2.5 MEQ tablet Take 2.5 mEq by mouth      torsemide (DEMADEX) 20 MG tablet Take 20 mg by mouth every morning      vitamin D3 (CHOLECALCIFEROL) 125 MCG (5000 UT) tablet Take 125 mcg by mouth daily      warfarin ANTICOAGULANT (COUMADIN) 5 MG tablet Take 1 tablet (5  mg) by mouth daily. 30 tablet 0    levonorgestrel (MIRENA) 52 MG (20 mcg/day) IUD 1 each by Intrauterine route once. Mirena IUD placed on 24.  Due for removal/replacement by 29.  LOT: [id number]  Exp: 2026  NDC: 90664-082-02       Current Facility-Administered Medications   Medication Dose Route Frequency Provider Last Rate Last Admin    levonorgestrel (KYLEENA) 19.5 MG IUD 1 each  1 each Intrauterine Once          Allergies   No Known Allergies    Social History  Social History     Tobacco Use    Smoking status: Former     Current packs/day: 0.00     Average packs/day: 1 pack/day for 5.0 years (5.0 ttl pk-yrs)     Types: Cigarettes     Quit date: 2013     Years since quittin.9    Smokeless tobacco: Never   Vaping Use    Vaping status: Never Used     Family History  No family history on file.    ROS: 10-Point ROS negative except as noted in HPI    OBJECTIVE     Physical Exam  /88   Pulse 82   Wt 105.7 kg (233 lb)   BMI 33.91 kg/m    Gen: Well-appearing, NAD  HEENT: Normocephalic, atraumatic  Neck: Thyroid is not enlarged, no appreciable masses palpated. Non-tender  CV:  RRR, no m/r/g auscultated  Pulm: CTAB, no w/r/r auscultated  Abd: Soft, non-tender, non-distended  Ext: No LE edema, extremities warm and well perfused  Pelvic:  Normal appearing external female genitalia. Normal hair distribution. Vagina is without lesions. Physiologic discharge. Cervix multiparous, no lesions, no cervical motion tenderness. Uterus is small, mobile, non-tender. No adnexal tenderness or masses/      ASSESSMENT & PLAN     Aditi Lei is a 29 year old  female here for contraception counseling in the setting of valvular heart disease complicated by HFpEF.  Current methods of contraception contraindicated include OCPs. However, other forms of contraception are not contraindicated. Discussed the methods of contraception available including insertion of another hormonal/non hormonal IUD, Levonorgestrel  implant (Nexplanon), depot medroxyprogesterone acetate injection, contraception ring. Due to possible side effect of weight gain and unpredictable effect on menses, she did not elect for the levonorgestrel implant at this time. Due to desire for menstrual regularity and reduction in menstrual flow, she strongly desired placement of another hormonal IUD. Because she had only experienced expulsions with Mirena IUD devices, it may be warranted to try a smaller frame device such as the Kyleena. Counseled the patient that there is no data to suggest that risk of expulsion would be lower with the Kyleena vs Mirena and that if expulsion would occur again, we would recommend another method of contraception. She agreed with the plan to try Kyleena IUD and if expulsion occurred again to return to clinic for insertion of Nexplanon.      PROCEDURE NOTE: -- IUD Insertion    Reason for Insertion: contraception and abnormal uterine bleeding    None- previously well tolerated without medication  Under sterile technique, cervix was visualized with speculum and prepped with Betadine solution swab x 3. Tenaculum was placed for stability. The uterus was gently straightened and sounded to 9.0 cm. IUD prepared for placement, and IUD inserted according to 's instructions without difficulty or significant resitance, and deployed at the fundus. The strings were visualized and trimmed to 2.0 cm from the external os. Tenaculum was removed and hemostasis noted. Speculum removed.  Patient tolerated procedure well.    EBL: minimal    Complications: none    Lot #: [id number]  Exp Date: 12/2026  Expected Removal Date: 11/2029      PLAN:    Given 's handouts, including when to have IUD removed, list of danger s/sx, side effects and follow up recommended. Encouraged condom use for prevention of STD. Back up contraception advised for 7 days if progestin method. Advised to call for any fever, for prolonged or severe pain or  bleeding, abnormal vaginal discharge, or unable to palpate strings. She was advised to use pain medications (ibuprofen) as needed for mild to moderate pain. Advised to follow-up in clinic in 4-6 weeks for IUD string check if unable to find strings or as directed by provider.     Patient seen and discussed with Dr. Mita Carranza MD  Obstetrics, Gynecology & Women's Health   Resident, PGY-1  11/27/2024 10:06 AM            Again, thank you for allowing me to participate in the care of your patient.      Sincerely,    Carola Fan MD

## 2024-11-29 DIAGNOSIS — Z98.84 S/P BILIOPANCREATIC DIVERSION WITH DUODENAL SWITCH: ICD-10-CM

## 2025-01-02 ENCOUNTER — DOCUMENTATION ONLY (OUTPATIENT)
Dept: URGENT CARE | Facility: URGENT CARE | Age: 30
End: 2025-01-02

## 2025-01-02 ENCOUNTER — HOSPITAL ENCOUNTER (EMERGENCY)
Facility: CLINIC | Age: 30
Discharge: HOME | End: 2025-01-02
Attending: EMERGENCY MEDICINE | Admitting: EMERGENCY MEDICINE
Payer: COMMERCIAL

## 2025-01-02 ENCOUNTER — APPOINTMENT (OUTPATIENT)
Dept: RADIOLOGY | Facility: CLINIC | Age: 30
End: 2025-01-02
Attending: EMERGENCY MEDICINE
Payer: COMMERCIAL

## 2025-01-02 VITALS
DIASTOLIC BLOOD PRESSURE: 54 MMHG | WEIGHT: 218 LBS | HEART RATE: 70 BPM | RESPIRATION RATE: 13 BRPM | HEIGHT: 66 IN | OXYGEN SATURATION: 99 % | SYSTOLIC BLOOD PRESSURE: 90 MMHG | TEMPERATURE: 98.5 F | BODY MASS INDEX: 35.03 KG/M2

## 2025-01-02 DIAGNOSIS — R06.4 HYPERVENTILATION: ICD-10-CM

## 2025-01-02 DIAGNOSIS — R07.89 ATYPICAL CHEST PAIN: ICD-10-CM

## 2025-01-02 LAB
ALBUMIN SERPL BCG-MCNC: 4 G/DL (ref 3.5–5.2)
ALP SERPL-CCNC: 73 U/L (ref 40–150)
ALT SERPL W P-5'-P-CCNC: 30 U/L (ref 0–50)
ANION GAP SERPL CALCULATED.3IONS-SCNC: 11 MMOL/L (ref 7–15)
APTT PPP: 32 SECONDS (ref 22–38)
AST SERPL W P-5'-P-CCNC: 31 U/L (ref 0–45)
BASOPHILS # BLD AUTO: 0 10E3/UL (ref 0–0.2)
BASOPHILS NFR BLD AUTO: 0 %
BILIRUB SERPL-MCNC: 0.3 MG/DL
BUN SERPL-MCNC: 19.4 MG/DL (ref 6–20)
CALCIUM SERPL-MCNC: 9 MG/DL (ref 8.8–10.4)
CHLORIDE SERPL-SCNC: 104 MMOL/L (ref 98–107)
CREAT SERPL-MCNC: 0.85 MG/DL (ref 0.51–0.95)
EGFRCR SERPLBLD CKD-EPI 2021: >90 ML/MIN/1.73M2
EOSINOPHIL # BLD AUTO: 0.1 10E3/UL (ref 0–0.7)
EOSINOPHIL NFR BLD AUTO: 1 %
ERYTHROCYTE [DISTWIDTH] IN BLOOD BY AUTOMATED COUNT: 14.4 % (ref 10–15)
GLUCOSE SERPL-MCNC: 112 MG/DL (ref 70–99)
HCO3 SERPL-SCNC: 25 MMOL/L (ref 22–29)
HCT VFR BLD AUTO: 37.3 % (ref 35–47)
HGB BLD-MCNC: 12.3 G/DL (ref 11.7–15.7)
HOLD SPECIMEN: NORMAL
IMM GRANULOCYTES # BLD: 0 10E3/UL
IMM GRANULOCYTES NFR BLD: 1 %
INR PPP: 1.28 (ref 0.85–1.15)
LYMPHOCYTES # BLD AUTO: 1.5 10E3/UL (ref 0.8–5.3)
LYMPHOCYTES NFR BLD AUTO: 17 %
MAGNESIUM SERPL-MCNC: 2.2 MG/DL (ref 1.7–2.3)
MCH RBC QN AUTO: 26.9 PG (ref 26.5–33)
MCHC RBC AUTO-ENTMCNC: 33 G/DL (ref 31.5–36.5)
MCV RBC AUTO: 82 FL (ref 78–100)
MONOCYTES # BLD AUTO: 0.6 10E3/UL (ref 0–1.3)
MONOCYTES NFR BLD AUTO: 6 %
NEUTROPHILS # BLD AUTO: 6.6 10E3/UL (ref 1.6–8.3)
NEUTROPHILS NFR BLD AUTO: 76 %
NRBC # BLD AUTO: 0 10E3/UL
NRBC BLD AUTO-RTO: 0 /100
PLATELET # BLD AUTO: 210 10E3/UL (ref 150–450)
POTASSIUM SERPL-SCNC: 3.5 MMOL/L (ref 3.4–5.3)
PROT SERPL-MCNC: 6.1 G/DL (ref 6.4–8.3)
RBC # BLD AUTO: 4.57 10E6/UL (ref 3.8–5.2)
SODIUM SERPL-SCNC: 140 MMOL/L (ref 135–145)
TROPONIN T SERPL HS-MCNC: <6 NG/L
WBC # BLD AUTO: 8.8 10E3/UL (ref 4–11)

## 2025-01-02 PROCEDURE — 83735 ASSAY OF MAGNESIUM: CPT | Performed by: EMERGENCY MEDICINE

## 2025-01-02 PROCEDURE — 71046 X-RAY EXAM CHEST 2 VIEWS: CPT

## 2025-01-02 PROCEDURE — 82040 ASSAY OF SERUM ALBUMIN: CPT | Performed by: EMERGENCY MEDICINE

## 2025-01-02 PROCEDURE — 82947 ASSAY GLUCOSE BLOOD QUANT: CPT | Performed by: EMERGENCY MEDICINE

## 2025-01-02 PROCEDURE — 36415 COLL VENOUS BLD VENIPUNCTURE: CPT | Performed by: EMERGENCY MEDICINE

## 2025-01-02 PROCEDURE — 85730 THROMBOPLASTIN TIME PARTIAL: CPT | Performed by: EMERGENCY MEDICINE

## 2025-01-02 PROCEDURE — 84484 ASSAY OF TROPONIN QUANT: CPT | Performed by: EMERGENCY MEDICINE

## 2025-01-02 PROCEDURE — 85610 PROTHROMBIN TIME: CPT | Performed by: EMERGENCY MEDICINE

## 2025-01-02 PROCEDURE — 99285 EMERGENCY DEPT VISIT HI MDM: CPT | Mod: 25

## 2025-01-02 PROCEDURE — 93005 ELECTROCARDIOGRAM TRACING: CPT | Performed by: EMERGENCY MEDICINE

## 2025-01-02 PROCEDURE — 85004 AUTOMATED DIFF WBC COUNT: CPT | Performed by: EMERGENCY MEDICINE

## 2025-01-02 PROCEDURE — 82310 ASSAY OF CALCIUM: CPT | Performed by: EMERGENCY MEDICINE

## 2025-01-02 PROCEDURE — 85014 HEMATOCRIT: CPT | Performed by: EMERGENCY MEDICINE

## 2025-01-02 ASSESSMENT — COLUMBIA-SUICIDE SEVERITY RATING SCALE - C-SSRS
2. HAVE YOU ACTUALLY HAD ANY THOUGHTS OF KILLING YOURSELF IN THE PAST MONTH?: NO
6. HAVE YOU EVER DONE ANYTHING, STARTED TO DO ANYTHING, OR PREPARED TO DO ANYTHING TO END YOUR LIFE?: NO
1. IN THE PAST MONTH, HAVE YOU WISHED YOU WERE DEAD OR WISHED YOU COULD GO TO SLEEP AND NOT WAKE UP?: NO

## 2025-01-02 ASSESSMENT — ACTIVITIES OF DAILY LIVING (ADL)
ADLS_ACUITY_SCORE: 41

## 2025-01-02 NOTE — ED TRIAGE NOTES
"Pt arrives to ED with c/o chest pain that started today more mild now than it was earlier today. Pt has a tricuspid valve replacement in August and is still on blood thinners due to \"low blood flow\" to the artifical valve per cardiology. Called her cards doc and told to go to the er. Shortness of breath occurred earlier today but is no longer present. Hx of blood clots as well.      Triage Assessment (Adult)       Row Name 01/02/25 4862          Triage Assessment    Airway WDL WDL        Respiratory WDL    Respiratory WDL WDL        Skin Circulation/Temperature WDL    Skin Circulation/Temperature WDL WDL        Cardiac WDL    Cardiac WDL X;chest pain        Chest Pain Assessment    Chest Pain Location midsternal     Character --  sore     Chest Pain Intervention 12-lead ECG obtained        Peripheral/Neurovascular WDL    Peripheral Neurovascular WDL WDL        Cognitive/Neuro/Behavioral WDL    Cognitive/Neuro/Behavioral WDL WDL                     "

## 2025-01-02 NOTE — PROGRESS NOTES
Pt arrived at  in [facility] with complaint of arm numbness and tingling, nausea, sweating. HX of heart surgery in August.     Per provider, BOZENA Dumont RN was sent to lobby to discuss appropriate care.      Through speaking with pt and family member, it was determined that ED would be more appropriate. UC was offered still but pt was informed of limited scope due to pt HX and symptoms.       Pt verbalized understanding and was agreeable to go to ED immediately. Appointment was cancelled.     Dayna LYNNE RN

## 2025-01-03 LAB
ATRIAL RATE - MUSE: 87 BPM
DIASTOLIC BLOOD PRESSURE - MUSE: NORMAL MMHG
INTERPRETATION ECG - MUSE: NORMAL
P AXIS - MUSE: 14 DEGREES
PR INTERVAL - MUSE: 152 MS
QRS DURATION - MUSE: 72 MS
QT - MUSE: 366 MS
QTC - MUSE: 440 MS
R AXIS - MUSE: 75 DEGREES
SYSTOLIC BLOOD PRESSURE - MUSE: NORMAL MMHG
T AXIS - MUSE: 27 DEGREES
VENTRICULAR RATE- MUSE: 87 BPM

## 2025-01-03 NOTE — DISCHARGE INSTRUCTIONS
The chest x-ray, EKG, and all laboratory test including cardiac enzymes appear reassuring here tonight in the emergency department.  Your symptoms sound consistent with a hyperventilation episode.  No further evaluation or treatment should be needed at this time.    Follow-up with your primary care provider for reevaluation.  Return back to ED sooner for any new or worsening chest pain, shortness breath, or any other concerning symptoms.

## 2025-01-03 NOTE — ED PROVIDER NOTES
EMERGENCY DEPARTMENT ENCOUNTER      NAME: Aditi Lei  AGE: 29 year old female  YOB: 1995  MRN: [id number]  EVALUATION DATE & TIME: 2025  6:23 PM    PCP: Kandi Mccallum    ED PROVIDER: Israel Vásquez DO      Chief Complaint   Patient presents with    Chest Pain         FINAL IMPRESSION:  1. Atypical chest pain    2. Hyperventilation          ED COURSE & MEDICAL DECISION MAKIN-year-old female presented to the ED for evaluation of sudden onset shortness of breath chest pain, dizziness, bilateral hand and perioral paresthesias.  The patient noted that her symptoms lasted for approximately 30 minutes before resolving spontaneously.  Patient reports feeling asymptomatic since the resolution of her symptoms hours ago.  The patient was hemodynamically stable upon arrival to the ED.  She did not appear to be in any obvious distress or discomfort at the time for initial evaluation.  The patient's physical exam was unremarkable.    An EKG was obtained which revealed normal sinus rhythm without any new or concerning ST or T wave changes.    CBC, CMP, troponin, and magnesium were all reassuring.  Repeat troponin was not indicated.    Chest x-ray was nondiagnostic.    The patient was reevaluated and informed of the lab and imaging results.  She was informed that her symptoms sound consistent with a hyperventilation episode.  Patient was educated about hyperventilation and reassured.  Following this the patient felt comfortable returning home.  She was instructed to follow-up with her primary care provider for reevaluation or to return back to ED sooner for any worsening chest pain, shortness breath, or any other new or concerning symptoms.    Pertinent Labs & Imaging studies reviewed. (See chart for details)  8:10 PM I met with the patient to gather history and to perform my initial exam. We discussed plans for the ED course, including diagnostic testing and treatment.       At the conclusion of the  encounter I discussed the results of all of the tests and the disposition. The questions were answered. The patient or family acknowledged understanding and was agreeable with the care plan.     Medical Decision Making    History:  Supplemental history from: N/A  External Record(s) reviewed: Documented in chart    Work Up:  Chart documentation includes differential considered and any EKGs or imaging independently interpreted by provider, where specified.  In additional to work up documented, I considered the following work up: Documented in chart, if applicable.    External consultation:  Discussion of management with another provider: Documented in chart, if applicable    Complicating factors:  Care impacted by chronic illness: Heart Disease  Care affected by social determinants of health: N/A    Disposition considerations: Discharge. No recommendations on prescription strength medication(s). See documentation for any additional details.    Not Applicable      PPE worn: n95 mask, goggles    MEDICATIONS GIVEN IN THE EMERGENCY:  Medications - No data to display    NEW PRESCRIPTIONS STARTED AT TODAY'S ER VISIT  Discharge Medication List as of 1/2/2025  9:36 PM             =================================================================    HPI    Patient information was obtained from: Patient    Use of : N/A         Aditi Lei is a 29 year old female with a pertinent history of heart failure with preserved ejection fraction, tricuspid valve regurgitation who is s/p Cv transcatheter tricuspid valve replacement, DVT, depression, polysubstance abuse with IV drug use, and endocarditis who presents to this ED by private car for evaluation of chest pain.    Patient reports while at work today around 2:00 PM she was doing a lifting task when she had a sudden onset of chest pressure and shortness of breath. Additionally, she endorses dizziness, lightheadedness, nausea, and numbness/tingling to her bilateral hands  and around her mouth. These symptoms lasted about 30 minutes and then resolved on their own. Symptoms have not returned since they resolved.   She has never had anything like this prior. No lower extremity edema or pain.     H/o IV drug use and endocarditis. No recent IV drug use. No other symptoms, complaints, or concerns at this time.      REVIEW OF SYSTEMS   Review of Systems as per HPI, otherwise systems reviewed are negative.     PAST MEDICAL HISTORY:  Past Medical History:   Diagnosis Date    Heart failure with preserved ejection fraction, NYHA class I (H)     Hiatal hernia 2024    Infective endocarditis of tricuspid valve     Tricuspid valve regurgitation        PAST SURGICAL HISTORY:  Past Surgical History:   Procedure Laterality Date    AS GASTROPLASTY DUODENAL SWITCH  2024    CV TRANSCATHETER TRICUSPID VALVE REPLACEMENT - FEMORAL APPROACH  2024    LAPAROSCOPIC CHOLECYSTECTOMY  2024    PILONIDAL CYST DRAINAGE             CURRENT MEDICATIONS:    calcium citrate (CITRACAL) 950 (200 Ca) MG tablet  levonorgestrel (KYLEENA) 19.5 MG IUD  levonorgestrel (MIRENA) 52 MG (20 mcg/day) IUD  magnesium oxide (MAG-OX) 400 MG tablet  methocarbamol (ROBAXIN) 500 MG tablet  Methylcobalamin 1000 MCG SUBL  norethindrone (MICRONOR) 0.35 MG tablet  Pediatric Multiple Vitamins (FLINTSTONES PLUS EXTRA C) CHEW  potassium gluconate 2.5 MEQ tablet  torsemide (DEMADEX) 20 MG tablet  vitamin D3 (CHOLECALCIFEROL) 125 MCG (5000 UT) tablet  warfarin ANTICOAGULANT (COUMADIN) 5 MG tablet        ALLERGIES:  No Known Allergies    FAMILY HISTORY:  No family history on file.    SOCIAL HISTORY:   Social History     Socioeconomic History    Marital status: Single   Tobacco Use    Smoking status: Former     Current packs/day: 0.00     Average packs/day: 1 pack/day for 5.0 years (5.0 ttl pk-yrs)     Types: Cigarettes     Quit date: 2013     Years since quittin.0    Smokeless tobacco: Never   Vaping Use    Vaping status:  Never Used     Social Drivers of Health     Financial Resource Strain: Low Risk  (7/31/2024)    Financial Resource Strain     Within the past 12 months, have you or your family members you live with been unable to get utilities (heat, electricity) when it was really needed?: No   Food Insecurity: Low Risk  (7/31/2024)    Food Insecurity     Within the past 12 months, did you worry that your food would run out before you got money to buy more?: No     Within the past 12 months, did the food you bought just not last and you didn t have money to get more?: No   Transportation Needs: Low Risk  (7/31/2024)    Transportation Needs     Within the past 12 months, has lack of transportation kept you from medical appointments, getting your medicines, non-medical meetings or appointments, work, or from getting things that you need?: No   Physical Activity: Insufficiently Active (4/16/2024)    Received from [facility]    Exercise Vital Sign     Days of Exercise per Week: 1 day     Minutes of Exercise per Session: 10 min   Stress: No Stress Concern Present (4/16/2024)    Received from [facility]    [employer] - Occupational Stress Questionnaire     Feeling of Stress : Not at all   Social Connections: Unknown (4/19/2024)    [facility]    Social Connections   Recent Concern: Social Connections - Moderately Isolated (4/16/2024)    Received from [facility]    Social Connection and Isolation Panel [NHANES]     Frequency of Communication with Friends and Family: More than three times a week     Frequency of Social Gatherings with Friends and Family: Once a week     Attends Scientology Services: Never     Active Member of Clubs or Organizations: No     Attends Club or Organization Meetings: Never     Marital Status: Living with partner   Interpersonal Safety: Low Risk  (7/31/2024)     "Interpersonal Safety     Do you feel physically and emotionally safe where you currently live?: Yes     Within the past 12 months, have you been hit, slapped, kicked or otherwise physically hurt by someone?: No     Within the past 12 months, have you been humiliated or emotionally abused in other ways by your partner or ex-partner?: No   Housing Stability: Low Risk  (7/31/2024)    Housing Stability     Do you have housing? : Yes     Are you worried about losing your housing?: No       VITALS:  BP 90/54   Pulse 70   Temp 98.5  F (36.9  C) (Oral)   Resp 13   Ht 1.676 m (5' 6\")   Wt 98.9 kg (218 lb)   SpO2 99%   BMI 35.19 kg/m      PHYSICAL EXAM    General presentation: Alert, Vital signs reviewed. NAD  HENT: ENT inspection is normal. Oropharynx is moist and clear.   Eye: Pupils are equal and reactive to light. EOMI  Neck: The neck is supple, with full ROM, with no evidence of meningismus.  Pulmonary: Currently in no acute respiratory distress. Normal, non labored respirations, the lung sounds are normal with good equal air movement. Clear to auscultation bilaterally.   Circulatory: Regular rate and rhythm. Peripheral pulses are strong and equal. No murmurs, rubs, or gallops.   Abdominal: The abdomen is soft. Nontender. No rigidity, guarding, or rebound. Bowel sounds normal.   Neurologic: Alert, oriented to person, place, and time. No motor deficit. No sensory deficit. Cranial nerves II through XII are intact.  Musculoskeletal: No extremity tenderness. Full range of motion in all extremities. No extremity edema.   Skin: Skin color is normal. No rash. Warm. Dry to touch.      LAB:  All pertinent labs reviewed and interpreted.  Results for orders placed or performed during the hospital encounter of 01/02/25   Chest XR,  PA & LAT    Impression    IMPRESSION: Prior sternotomy and tricuspid valve repair. Normal heart size. Mildly enlarged main pulmonary artery contour. No evidence for CHF or pneumonia. No pleural " effusion or pneumothorax.   Extra Blue Top Tube   Result Value Ref Range    Hold Specimen JIC    Extra Red Top Tube   Result Value Ref Range    Hold Specimen JIC    Extra Green Top (Lithium Heparin) Tube   Result Value Ref Range    Hold Specimen JIC    Extra Purple Top Tube   Result Value Ref Range    Hold Specimen     Result Value Ref Range    INR 1.28 (H) 0.85 - 1.15   Partial thromboplastin time   Result Value Ref Range    aPTT 32 22 - 38 Seconds   Comprehensive metabolic panel   Result Value Ref Range    Sodium 140 135 - 145 mmol/L    Potassium 3.5 3.4 - 5.3 mmol/L    Carbon Dioxide (CO2) 25 22 - 29 mmol/L    Anion Gap 11 7 - 15 mmol/L    Urea Nitrogen 19.4 6.0 - 20.0 mg/dL    Creatinine 0.85 0.51 - 0.95 mg/dL    GFR Estimate >90 >60 mL/min/1.73m2    Calcium 9.0 8.8 - 10.4 mg/dL    Chloride 104 98 - 107 mmol/L    Glucose 112 (H) 70 - 99 mg/dL    Alkaline Phosphatase 73 40 - 150 U/L    AST 31 0 - 45 U/L    ALT 30 0 - 50 U/L    Protein Total 6.1 (L) 6.4 - 8.3 g/dL    Albumin 4.0 3.5 - 5.2 g/dL    Bilirubin Total 0.3 <=1.2 mg/dL   Result Value Ref Range    Magnesium 2.2 1.7 - 2.3 mg/dL   Result Value Ref Range    Troponin T, High Sensitivity <6 <=14 ng/L   CBC with platelets and differential   Result Value Ref Range    WBC Count 8.8 4.0 - 11.0 10e3/uL    RBC Count 4.57 3.80 - 5.20 10e6/uL    Hemoglobin 12.3 11.7 - 15.7 g/dL    Hematocrit 37.3 35.0 - 47.0 %    MCV 82 78 - 100 fL    MCH 26.9 26.5 - 33.0 pg    MCHC 33.0 31.5 - 36.5 g/dL    RDW 14.4 10.0 - 15.0 %    Platelet Count 210 150 - 450 10e3/uL    % Neutrophils 76 %    % Lymphocytes 17 %    % Monocytes 6 %    % Eosinophils 1 %    % Basophils 0 %    % Immature Granulocytes 1 %    NRBCs per 100 WBC 0 <1 /100    Absolute Neutrophils 6.6 1.6 - 8.3 10e3/uL    Absolute Lymphocytes 1.5 0.8 - 5.3 10e3/uL    Absolute Monocytes 0.6 0.0 - 1.3 10e3/uL    Absolute Eosinophils 0.1 0.0 - 0.7 10e3/uL    Absolute Basophils 0.0 0.0 - 0.2 10e3/uL    Absolute Immature Granulocytes  0.0 <=0.4 10e3/uL    Absolute NRBCs 0.0 10e3/uL       RADIOLOGY:  Reviewed all pertinent imaging. Please see official radiology report.  Chest XR,  PA & LAT   Final Result   IMPRESSION: Prior sternotomy and tricuspid valve repair. Normal heart size. Mildly enlarged main pulmonary artery contour. No evidence for CHF or pneumonia. No pleural effusion or pneumothorax.          EKG:    Normal sinus rhythm.  Rate of 87.  Right bundle branch block.  Normal QT.  Nonspecific T wave changes noted.  Compared to EKG on 7/31/2024 no significant changes are noted.    I have independently reviewed and interpreted the EKG(s) documented above.      I, Nadeen Longoria , am serving as a scribe to document services personally performed by Israel Vásquez DO based on my observation and the provider's statements to me. I, Israel Vásquez, attest that Nadeen Longoria is acting in a scribe capacity, has observed my performance of the services and has documented them in accordance with my direction.    Israel Vásquez DO  Emergency Medicine  [facility] EMERGENCY ROOM  [facility] [facility] [id number] [phone]       Israel Vásquez DO  01/02/25 7250

## 2025-01-27 DIAGNOSIS — Z98.84 S/P BILIOPANCREATIC DIVERSION WITH DUODENAL SWITCH: Primary | ICD-10-CM

## 2025-02-04 ENCOUNTER — MYC MEDICAL ADVICE (OUTPATIENT)
Dept: MIDWIFE SERVICES | Facility: CLINIC | Age: 30
End: 2025-02-04
Payer: COMMERCIAL

## 2025-02-13 ENCOUNTER — OFFICE VISIT (OUTPATIENT)
Dept: OBGYN | Facility: CLINIC | Age: 30
End: 2025-02-13
Attending: REGISTERED NURSE
Payer: COMMERCIAL

## 2025-02-13 VITALS
BODY MASS INDEX: 35.19 KG/M2 | SYSTOLIC BLOOD PRESSURE: 115 MMHG | HEIGHT: 66 IN | HEART RATE: 83 BPM | DIASTOLIC BLOOD PRESSURE: 74 MMHG

## 2025-02-13 DIAGNOSIS — Z11.3 SCREENING EXAMINATION FOR STI: ICD-10-CM

## 2025-02-13 DIAGNOSIS — Z30.430 ENCOUNTER FOR INSERTION OF INTRAUTERINE CONTRACEPTIVE DEVICE: Primary | ICD-10-CM

## 2025-02-13 LAB
HCG UR QL: NEGATIVE
INTERNAL QC OK POCT: NORMAL
POCT KIT EXPIRATION DATE: NORMAL
POCT KIT LOT NUMBER: NORMAL

## 2025-02-13 PROCEDURE — 250N000011 HC RX IP 250 OP 636: Performed by: REGISTERED NURSE

## 2025-02-13 PROCEDURE — 58300 INSERT INTRAUTERINE DEVICE: CPT | Performed by: REGISTERED NURSE

## 2025-02-13 PROCEDURE — 81025 URINE PREGNANCY TEST: CPT | Performed by: REGISTERED NURSE

## 2025-02-13 RX ADMIN — LEVONORGESTREL 1 EACH: 52 INTRAUTERINE DEVICE INTRAUTERINE at 09:33

## 2025-02-13 NOTE — PROGRESS NOTES
"IUD Insertion:  CONSULT:    Is a pregnancy test required: Yes.  Was it positive or negative?  Negative  Was a consent obtained?  Yes    Subjective: Aditi Lei is a 29 year old  presents for IUD and desires Mirena type IUD. She previously had the Mirena IUD placed 24 with Dr. Fan after a history of multiple spontaneous expulsions. Unfortunately, she experienced another spontaneous expulsion on 2/3/2025. She did see the device after it expulsed.      Patient has been given the opportunity to ask questions about all forms of birth control, including all options appropriate for Aditi Lei. Discussed that no method of birth control, except abstinence is 100% effective against pregnancy or sexually transmitted infection.     Aditi Lei understands she may have the IUD removed at any time. IUD should be removed by a health care provider.    The entire insertion procedure was reviewed with the patient, including care after placement.    No LMP recorded. (Menstrual status: Birth Control). No allergy to betadine or shellfish. Patient desires STD screening  hCG Urine Qualitative   Date Value Ref Range Status   2024 Negative Negative Final     Comment:     This test is for screening purposes.  Results should be interpreted along with the clinical picture.  Confirmation testing is available if warranted by ordering [id number], HCG Quantitative Pregnancy.         /74   Pulse 83   Ht 1.676 m (5' 6\")   BMI 35.19 kg/m      Pelvic Exam:   EG/BUS: normal genital architecture without lesions, erythema or abnormal secretions.   Vagina: moist, pink, rugae with physiologic discharge and secretions  Cervix: multiparous no lesions and pink, moist, closed, without lesion or CMT. GC/CT swab collected.     PROCEDURE NOTE: -- IUD Insertion    Reason for Insertion: contraception and abnormal uterine bleeding    Premedicated with ibuprofen.  Under sterile technique, cervix was visualized with speculum and prepped " with Betadine solution swab x 3. Tenaculum was placed for stability. The uterus was gently straightened and sounded to 8.0 cm. IUD prepared for placement, and IUD inserted according to 's instructions without difficulty or significant resitance, and deployed at the fundus. The strings were visualized and trimmed to 2.5 cm from the external os. Tenaculum was removed and hemostasis noted. Speculum removed.  Patient tolerated procedure well.    EBL: minimal    Complications: none    ASSESSMENT:     ICD-10-CM    1. Encounter for insertion of intrauterine contraceptive device  Z30.430 levonorgestrel (MIRENA) 52 MG (20 mcg/day) IUD 1 each     INSERTION INTRAUTERINE DEVICE           PLAN:    Given 's handouts, including when to have IUD removed, list of danger s/sx, side effects and follow up recommended. Encouraged condom use for prevention of STD. Back up contraception advised for 7 days if progestin method. Advised to call for any fever, for prolonged or severe pain or bleeding, abnormal vaginal discharge, or unable to palpate strings. She was advised to use pain medications (ibuprofen) as needed for mild to moderate pain. Advised to follow-up in clinic in 4-6 weeks for IUD string check with TVUS given her hx of expulsions. Plan to follow up with Dr. Fan if this IUD expulses again or if US shows it is malpositioned.      LAWANDA Gonsalez CNM

## 2025-02-13 NOTE — LETTER
"2025       RE: Aditi Lei  [address]  [place] [address]     Dear Colleague,    Thank you for referring your patient, Aditi Lei, to the [facility] [place] at [facility]. Please see a copy of my visit note below.    IUD Insertion:  CONSULT:    Is a pregnancy test required: Yes.  Was it positive or negative?  Negative  Was a consent obtained?  Yes    Subjective: Aditi Lei is a 29 year old  presents for IUD and desires Mirena type IUD. She previously had the Mirena IUD placed 24 with Dr. Fan after a history of multiple spontaneous expulsions. Unfortunately, she experienced another spontaneous expulsion on 2/3/2025. She did see the device after it expulsed.      Patient has been given the opportunity to ask questions about all forms of birth control, including all options appropriate for Aditi Lei. Discussed that no method of birth control, except abstinence is 100% effective against pregnancy or sexually transmitted infection.     Aditi Lei understands she may have the IUD removed at any time. IUD should be removed by a health care provider.    The entire insertion procedure was reviewed with the patient, including care after placement.    No LMP recorded. (Menstrual status: Birth Control). No allergy to betadine or shellfish. Patient desires STD screening  hCG Urine Qualitative   Date Value Ref Range Status   2024 Negative Negative Final     Comment:     This test is for screening purposes.  Results should be interpreted along with the clinical picture.  Confirmation testing is available if warranted by ordering [id number], HCG Quantitative Pregnancy.         /74   Pulse 83   Ht 1.676 m (5' 6\")   BMI 35.19 kg/m      Pelvic Exam:   EG/BUS: normal genital architecture without lesions, erythema or abnormal secretions.   Vagina: moist, pink, rugae with physiologic discharge and secretions  Cervix: " multiparous no lesions and pink, moist, closed, without lesion or CMT. GC/CT swab collected.     PROCEDURE NOTE: -- IUD Insertion    Reason for Insertion: contraception and abnormal uterine bleeding    Premedicated with ibuprofen.  Under sterile technique, cervix was visualized with speculum and prepped with Betadine solution swab x 3. Tenaculum was placed for stability. The uterus was gently straightened and sounded to 8.0 cm. IUD prepared for placement, and IUD inserted according to 's instructions without difficulty or significant resitance, and deployed at the fundus. The strings were visualized and trimmed to 2.5 cm from the external os. Tenaculum was removed and hemostasis noted. Speculum removed.  Patient tolerated procedure well.    EBL: minimal    Complications: none    ASSESSMENT:     ICD-10-CM    1. Encounter for insertion of intrauterine contraceptive device  Z30.430 levonorgestrel (MIRENA) 52 MG (20 mcg/day) IUD 1 each     INSERTION INTRAUTERINE DEVICE           PLAN:    Given 's handouts, including when to have IUD removed, list of danger s/sx, side effects and follow up recommended. Encouraged condom use for prevention of STD. Back up contraception advised for 7 days if progestin method. Advised to call for any fever, for prolonged or severe pain or bleeding, abnormal vaginal discharge, or unable to palpate strings. She was advised to use pain medications (ibuprofen) as needed for mild to moderate pain. Advised to follow-up in clinic in 4-6 weeks for IUD string check with TVUS given her hx of expulsions. Plan to follow up with Dr. Fan if this IUD expulses again or if US shows it is malpositioned.      LAWANDA Gonsalez CNM        Again, thank you for allowing me to participate in the care of your patient.      Sincerely,    LAWANDA Gonsalez CNM

## 2025-02-15 ENCOUNTER — HOSPITAL ENCOUNTER (EMERGENCY)
Facility: HOSPITAL | Age: 30
Discharge: HOME | End: 2025-02-15
Payer: COMMERCIAL

## 2025-02-15 ENCOUNTER — APPOINTMENT (OUTPATIENT)
Dept: RADIOLOGY | Facility: HOSPITAL | Age: 30
End: 2025-02-15
Payer: COMMERCIAL

## 2025-02-15 VITALS
WEIGHT: 204.3 LBS | SYSTOLIC BLOOD PRESSURE: 101 MMHG | BODY MASS INDEX: 32.83 KG/M2 | HEART RATE: 71 BPM | DIASTOLIC BLOOD PRESSURE: 56 MMHG | RESPIRATION RATE: 17 BRPM | HEIGHT: 66 IN | TEMPERATURE: 98 F | OXYGEN SATURATION: 98 %

## 2025-02-15 DIAGNOSIS — N93.9 VAGINAL BLEEDING: ICD-10-CM

## 2025-02-15 DIAGNOSIS — D64.9 ANEMIA, UNSPECIFIED TYPE: ICD-10-CM

## 2025-02-15 DIAGNOSIS — R06.02 SHORTNESS OF BREATH: ICD-10-CM

## 2025-02-15 LAB
ABO + RH BLD: NORMAL
ALBUMIN SERPL BCG-MCNC: 4.2 G/DL (ref 3.5–5.2)
ALP SERPL-CCNC: 80 U/L (ref 40–150)
ALT SERPL W P-5'-P-CCNC: 28 U/L (ref 0–50)
ANION GAP SERPL CALCULATED.3IONS-SCNC: 12 MMOL/L (ref 7–15)
AST SERPL W P-5'-P-CCNC: 26 U/L (ref 0–45)
BASOPHILS # BLD AUTO: 0 10E3/UL (ref 0–0.2)
BASOPHILS NFR BLD AUTO: 0 %
BILIRUB SERPL-MCNC: 0.4 MG/DL
BLD GP AB SCN SERPL QL: NEGATIVE
BUN SERPL-MCNC: 24.2 MG/DL (ref 6–20)
CALCIUM SERPL-MCNC: 9.4 MG/DL (ref 8.8–10.4)
CHLORIDE SERPL-SCNC: 103 MMOL/L (ref 98–107)
CREAT SERPL-MCNC: 1.01 MG/DL (ref 0.51–0.95)
EGFRCR SERPLBLD CKD-EPI 2021: 77 ML/MIN/1.73M2
EOSINOPHIL # BLD AUTO: 0.1 10E3/UL (ref 0–0.7)
EOSINOPHIL NFR BLD AUTO: 1 %
ERYTHROCYTE [DISTWIDTH] IN BLOOD BY AUTOMATED COUNT: 17.9 % (ref 10–15)
GLUCOSE SERPL-MCNC: 99 MG/DL (ref 70–99)
HCG SERPL QL: NEGATIVE
HCO3 SERPL-SCNC: 27 MMOL/L (ref 22–29)
HCT VFR BLD AUTO: 31.3 % (ref 35–47)
HGB BLD-MCNC: 9.9 G/DL (ref 11.7–15.7)
IMM GRANULOCYTES # BLD: 0 10E3/UL
IMM GRANULOCYTES NFR BLD: 0 %
LYMPHOCYTES # BLD AUTO: 1.4 10E3/UL (ref 0.8–5.3)
LYMPHOCYTES NFR BLD AUTO: 24 %
MCH RBC QN AUTO: 26.3 PG (ref 26.5–33)
MCHC RBC AUTO-ENTMCNC: 31.6 G/DL (ref 31.5–36.5)
MCV RBC AUTO: 83 FL (ref 78–100)
MONOCYTES # BLD AUTO: 0.4 10E3/UL (ref 0–1.3)
MONOCYTES NFR BLD AUTO: 7 %
NEUTROPHILS # BLD AUTO: 3.8 10E3/UL (ref 1.6–8.3)
NEUTROPHILS NFR BLD AUTO: 66 %
NRBC # BLD AUTO: 0 10E3/UL
NRBC BLD AUTO-RTO: 0 /100
NT-PROBNP SERPL-MCNC: 245 PG/ML (ref 0–450)
PLATELET # BLD AUTO: 235 10E3/UL (ref 150–450)
POTASSIUM SERPL-SCNC: 3.7 MMOL/L (ref 3.4–5.3)
PROT SERPL-MCNC: 6.5 G/DL (ref 6.4–8.3)
RBC # BLD AUTO: 3.76 10E6/UL (ref 3.8–5.2)
SODIUM SERPL-SCNC: 142 MMOL/L (ref 135–145)
SPECIMEN EXP DATE BLD: NORMAL
TROPONIN T SERPL HS-MCNC: 8 NG/L
WBC # BLD AUTO: 5.7 10E3/UL (ref 4–11)

## 2025-02-15 PROCEDURE — 84484 ASSAY OF TROPONIN QUANT: CPT

## 2025-02-15 PROCEDURE — 71045 X-RAY EXAM CHEST 1 VIEW: CPT

## 2025-02-15 PROCEDURE — 86901 BLOOD TYPING SEROLOGIC RH(D): CPT | Performed by: EMERGENCY MEDICINE

## 2025-02-15 PROCEDURE — 36415 COLL VENOUS BLD VENIPUNCTURE: CPT | Performed by: EMERGENCY MEDICINE

## 2025-02-15 PROCEDURE — 99285 EMERGENCY DEPT VISIT HI MDM: CPT | Mod: 25

## 2025-02-15 PROCEDURE — 83880 ASSAY OF NATRIURETIC PEPTIDE: CPT | Performed by: EMERGENCY MEDICINE

## 2025-02-15 PROCEDURE — 82040 ASSAY OF SERUM ALBUMIN: CPT | Performed by: EMERGENCY MEDICINE

## 2025-02-15 PROCEDURE — 93005 ELECTROCARDIOGRAM TRACING: CPT | Performed by: EMERGENCY MEDICINE

## 2025-02-15 PROCEDURE — 84703 CHORIONIC GONADOTROPIN ASSAY: CPT | Performed by: EMERGENCY MEDICINE

## 2025-02-15 PROCEDURE — 85025 COMPLETE CBC W/AUTO DIFF WBC: CPT | Performed by: EMERGENCY MEDICINE

## 2025-02-15 RX ORDER — FERROUS SULFATE 325(65) MG
325 TABLET, DELAYED RELEASE (ENTERIC COATED) ORAL DAILY
Qty: 30 TABLET | Refills: 0 | Status: SHIPPED | OUTPATIENT
Start: 2025-02-15 | End: 2025-03-17

## 2025-02-15 ASSESSMENT — ACTIVITIES OF DAILY LIVING (ADL)
ADLS_ACUITY_SCORE: 41

## 2025-02-15 ASSESSMENT — COLUMBIA-SUICIDE SEVERITY RATING SCALE - C-SSRS
6. HAVE YOU EVER DONE ANYTHING, STARTED TO DO ANYTHING, OR PREPARED TO DO ANYTHING TO END YOUR LIFE?: NO
1. IN THE PAST MONTH, HAVE YOU WISHED YOU WERE DEAD OR WISHED YOU COULD GO TO SLEEP AND NOT WAKE UP?: NO
2. HAVE YOU ACTUALLY HAD ANY THOUGHTS OF KILLING YOURSELF IN THE PAST MONTH?: NO

## 2025-02-15 NOTE — ED PROVIDER NOTES
"EMERGENCY DEPARTMENT ENCOUNTER      NAME: Aditi Lei  AGE: 29 year old female  YOB: 1995  MRN: [id number]  EVALUATION DATE & TIME: 2/15/2025  4:39 PM    PCP: Kandi Mccallum    ED PROVIDER: Esau Woods MD    FINAL IMPRESSION:  1. Vaginal bleeding    2. Anemia, unspecified type    3. Shortness of breath        ED COURSE & MEDICAL DECISION MAKING:    Pertinent Labs & Imaging studies reviewed. (See chart for details)  29 year old female presents to the Emergency Department for evaluation of weakness, shortness of breath, vaginal bleeding..  Differential diagnosis considered Myocardial infarction, acute coronary syndrome, congestive heart failure, COPD exacerbation, asthma exacerbation, pneumonia, aspiration, anaphylaxis, pulmonary embolism, pneumothorax, COVID-19, influenza A, anxiety, Ectopic pregnancy, miscarriage, dysfunctional uterine bleeding, fibroids, vaginal laceration, anemia, coagulopathy.     Triage Note: Patient walks in for evaluation of weakness, SOB, vaginal bleeding. SOB and feeling like fainting while walking up stairs x4 days endorsed by patient. She had tricuspid replacement in August '24 and placed on Eliquis. IUD fell out 2/3/35 with vaginal bleeding that started at that time. Endorses that she has been going through briefs once every 2 hours by 3 weeks. IUD replaced on Thursday, 2/14, and states that bleeding has slowed down and gone through 4-5 briefs since replacement.         ED Course as of 02/15/25 2208   Sat Feb 15, 2025   1655 Patient has a history of tricuspid valve replacement and is on Eliquis.  She had an IUD follow-up on 2/13 and had vaginal bleeding that is actually substantially improved after having an IUD replacement on 2/14.  She is having shortness of breath and dyspnea on exertion.  She is worried about going to work tomorrow as she works as a  and \"the boxes are heavy\".  Denies lower extremity swelling, worsening vaginal bleeding, chest pain, cough or " fevers.    On exam patient is well-appearing and nontoxic.  She has pink conjunctive.  Doubt significant and acute blood loss anemia.  Her abdomen is benign.  Vital signs are reassuring.  Will provide broad laboratory workup as well as a checks x-ray.   1656 EKG does not show acute ischemia.   1747 CBC with Platelets & Differential(!)  Hemoglobin 9.9, previously was 12.3.  Likely cause of patient's shortness of breath.  Will provide iron supplementation.  Vaginal bleeding has dramatically improved  per her report and further her body requires time to increase the iron hemoglobin that she has an IUD placed.   1749 Updated patient at bedside.  With reassuring workup.  Troponin was normal within normal limits.  Do not believe she requires a delta troponin.  Discussed this with the patient at bedside she agrees.  BNP was not elevated doubt acute CHF exacerbation.  She does not have a new murmur and I have lower concern for valve failure.  Beta-hCG was negative doubt ectopic pregnancy.       Not Applicable    I discussed the plan for discharge with the patient and patient is agreeable. We discussed supportive cares at home and reasons to return to the ER including new or worsening symptoms. All questions and concerns addressed to the best of my ability. Strict return precautions discussed. Patient to be discharge by RN.    At the conclusion of the encounter I discussed the results of the tests and the disposition. The questions were answered. The patient or family acknowledged understanding and was agreeable with the care plan.     MEDICATIONS GIVEN IN THE EMERGENCY:  Medications - No data to display    NEW PRESCRIPTIONS STARTED AT TODAY'S ER VISIT  Discharge Medication List as of 2/15/2025  7:26 PM        START taking these medications    Details   ferrous sulfate (FE TABS) 325 (65 Fe) MG EC tablet Take 1 tablet (325 mg) by mouth daily., Disp-30 tablet, R-0, Local Print           Discharge Medication List as of  "2/15/2025  7:26 PM          =================================================================    HPI    Aditi Lei is a 29 year old female with a pertinent history of heart failure with preserved ejection fraction, tricuspid valve regurgitation who is s/p Cv transcatheter tricuspid valve replacement, endocarditis, on Eliquis, DVT, depression, polysubstance abuse with IV drug use, IUD placement 02/13/2025 (2 days ago),  who presents to this ED for evaluation of shortness of breath and vaginal bleeding.    For the past 4-5 days (~02/10/2025) she has endorsed increased weakness, shortness of breath, and dyspnea on exertion described as \"only being able to carry my 2 year old very short distances\". She is concerned about going to work tomorrow at [employer], as \"the boxes are heavy to lift\".      She has endorsed ongoing vaginal bleeding which has been progressively improving since IUD placement 2 days ago (02/13/2025). She has been soaking through both an ultra tampon and adult diaper every 4 hours. Her OB/Gyn is reportedly Dr. Fan through [facility].     She has been compliant with her Eliquis, she denies missing a dose. She denies chest pain, cough, fever, or any other symptoms at this time.        Per chart review, 02/13/2025 [facility] visit for Mirena type IUD placement.  She previously had the Mirena IUD placed 11/27/24 with Dr. Fan after a history of multiple spontaneous expulsions. She experienced another spontaneous expulsion on 2/3/2025. She did see the device after it expulsed.  A new IUD was inserted for contraception and abnormal uterine bleeding. Patient discharged in stable condition with follow up recommendations given.     Per chart review, 01/02/2025 [facility] Emergency Department visit for evaluation of sudden onset of shortness of breath, dizziness, bilateral hand perioral paresthesias. Her symptoms lasted for 30 minutes before resolving " "spontaneously. The patient was hemodynamically stable upon arrival to the ED. She did not appear to be in any obvious distress or discomfort at the time for initial evaluation. The patient's physical exam was unremarkable. An EKG was obtained which revealed normal sinus rhythm without any new or concerning ST or T wave changes. CBC, CMP, troponin, and magnesium were all reassuring.  Repeat troponin was not indicated. Chest x-ray was nondiagnostic. Her symptoms were consistent with a hyperventilation episode. She was discharged in stable condition with follow up instructions given.       Use of : N\A    PHYSICAL EXAM    /56   Pulse 71   Temp 98  F (36.7  C) (Oral)   Resp 17   Ht 1.676 m (5' 6\")   Wt 92.7 kg (204 lb 4.8 oz)   SpO2 98%   BMI 32.97 kg/m    Constitutional: Well appearing. Non toxic  Head: Normocephalic, atraumatic, mucous membranes moist, nose normal.   Neck: Supple, gross ROM intact.   Eyes: Pupils mid-range. Pink conjunctiva.   Respiratory: Clear to auscultation bilaterally, no respiratory distress, no wheezing, speaks full sentences easily.  Cardiovascular: Normal heart rate, regular rhythm, no murmurs. No lower extremity edema.   GI: Soft, no tenderness to deep palpation in all quadrants.  Musculoskeletal: Moving all 4 extremities intentionally and without pain. No obvious deformity.  Skin: Warm, dry, no rash.  Neurologic: Alert & oriented x 3, speech clear, moving all extremities spontaneously   Psychiatric: Affect normal, cooperative.      LAB:  All pertinent labs reviewed and interpreted.  Results for orders placed or performed during the hospital encounter of 02/15/25   XR Chest Port 1 View    Impression    IMPRESSION: Sternotomy. Lungs clear. No effusions seen.   Comprehensive metabolic panel   Result Value Ref Range    Sodium 142 135 - 145 mmol/L    Potassium 3.7 3.4 - 5.3 mmol/L    Carbon Dioxide (CO2) 27 22 - 29 mmol/L    Anion Gap 12 7 - 15 mmol/L    Urea Nitrogen " 24.2 (H) 6.0 - 20.0 mg/dL    Creatinine 1.01 (H) 0.51 - 0.95 mg/dL    GFR Estimate 77 >60 mL/min/1.73m2    Calcium 9.4 8.8 - 10.4 mg/dL    Chloride 103 98 - 107 mmol/L    Glucose 99 70 - 99 mg/dL    Alkaline Phosphatase 80 40 - 150 U/L    AST 26 0 - 45 U/L    ALT 28 0 - 50 U/L    Protein Total 6.5 6.4 - 8.3 g/dL    Albumin 4.2 3.5 - 5.2 g/dL    Bilirubin Total 0.4 <=1.2 mg/dL   Nt probnp inpatient   Result Value Ref Range    N terminal Pro BNP Inpatient 245 0 - 450 pg/mL   HCG QUALitative pregnancy (blood)   Result Value Ref Range    hCG Serum Qualitative Negative Negative   Result Value Ref Range    Troponin T, High Sensitivity 8 <=14 ng/L   CBC with platelets and differential   Result Value Ref Range    WBC Count 5.7 4.0 - 11.0 10e3/uL    RBC Count 3.76 (L) 3.80 - 5.20 10e6/uL    Hemoglobin 9.9 (L) 11.7 - 15.7 g/dL    Hematocrit 31.3 (L) 35.0 - 47.0 %    MCV 83 78 - 100 fL    MCH 26.3 (L) 26.5 - 33.0 pg    MCHC 31.6 31.5 - 36.5 g/dL    RDW 17.9 (H) 10.0 - 15.0 %    Platelet Count 235 150 - 450 10e3/uL    % Neutrophils 66 %    % Lymphocytes 24 %    % Monocytes 7 %    % Eosinophils 1 %    % Basophils 0 %    % Immature Granulocytes 0 %    NRBCs per 100 WBC 0 <1 /100    Absolute Neutrophils 3.8 1.6 - 8.3 10e3/uL    Absolute Lymphocytes 1.4 0.8 - 5.3 10e3/uL    Absolute Monocytes 0.4 0.0 - 1.3 10e3/uL    Absolute Eosinophils 0.1 0.0 - 0.7 10e3/uL    Absolute Basophils 0.0 0.0 - 0.2 10e3/uL    Absolute Immature Granulocytes 0.0 <=0.4 10e3/uL    Absolute NRBCs 0.0 10e3/uL   Adult Type and Screen   Result Value Ref Range    ABO/RH(D) B POS     Antibody Screen Negative Negative    SPECIMEN EXPIRATION DATE [id number]        RADIOLOGY:  Reviewed all pertinent imaging. Please see official radiology report.  XR Chest Port 1 View   Final Result   IMPRESSION: Sternotomy. Lungs clear. No effusions seen.          EKG:    Performed at: 15-Feb-2025 16:53:23    Impression: Sinus rhythm. Rightward axis. Low voltage QRS. Borderline  ECG.     Rate: 91  Rhythm: Sinus  NV Interval: 150  QRS Interval: 70  QTc Interval: 462  ST Changes: No STEMI.  Comparison: When compared with ECG of 02-Jan-2025 17:52, No significant change was found.     I have independently reviewed and interpreted the EKG(s) documented above.    Esau Woods MD  [facility] EMERGENCY DEPARTMENT  [facility] [facility] [address]  [phone]   =================================================================    BILLING:  Data  Category 1  Non-ED record review, if applicable. External record reviewed: Reviewed recent office visit 02/13/2025 [facility] visit, 01/02/2025 [facility] Emergency Department visit       Clinical information was obtained from an independent historian. History was obtained from: Patient     The following testing was considered but ultimately not selected after discussion with patient/family: NA     Category 2  My independent interpretation of EKG, rhythm strip, radiology study: Chest x-ray did not reveal large pneumothorax     Category 3  Discussion of management with other physician/healthcare provider/other source: N/A       Risk  Prescription medication was considered, but ultimately not given after discussion with patient/family: N/A     Chronic conditions affecting care:  heart failure with preserved ejection fraction, tricuspid valve regurgitation who is s/p Cv transcatheter tricuspid valve replacement, endocarditis, DVT, depression, polysubstance abuse with IV drug use, IUD placement 02/13/2025 (2 days ago)     Consideration of Admission/Observation: Escalation of care including admission/observation was considered given the complexity and risk of the patient's presenting complaint, exam findings, and/or their underlying comorbidities. However, ultimately I feel the patient is safe for outpatient management with close follow up. Reasoning: Work-up reassuring,  does not reveal any acute life/organ threatening processes, patient's symptoms well controlled upon reevaluation, reexamination is reassuring, vitals are stable, patient agreeable with discharge, reliable for follow-up.     Consider admission for anemia however patient has a hemoglobin of 9.9.  Do not believe she requires a blood transfusion      I, Will Lovelace, am serving as a scribe to document services personally performed by Esau Woods MD based on my observation and the provider's statements to me. I, Esau Woods MD, attest that Will Lovelace is acting in a scribe capacity, has observed my performance of the services and has documented them in accordance with my direction.      Esau Woods MD  02/15/25 3825

## 2025-02-15 NOTE — Clinical Note
Aditi Lei was seen and treated in our emergency department on 2/15/2025.  She may return to work on 02/17/2025.       If you have any questions or concerns, please don't hesitate to call.      Esau Woods MD

## 2025-02-15 NOTE — ED TRIAGE NOTES
Patient walks in for evaluation of weakness, SOB, vaginal bleeding. SOB and feeling like fainting while walking up stairs x4 days endorsed by patient. She had tricuspid replacement in August '24 and placed on Eliquis. IUD fell out 2/3/35 with vaginal bleeding that started at that time. Endorses that she has been going through briefs once every 2 hours by 3 weeks. IUD replaced on Thursday, 2/14, and states that bleeding has slowed down and gone through 4-5 briefs since replacement.

## 2025-02-16 NOTE — DISCHARGE INSTRUCTIONS
You are seen here for shortness of breath.  No emergent intervention was necessary.  This is likely because your hemoglobin is 9.9.  I believe your vaginal bleeding will stop when your hormones are regulated due to your IUD.  If you having worsening bleeding, worsening shortness of breath present to the emergency department.

## 2025-02-16 NOTE — ED NOTES
AVS discussed with Pt, medication instructions provided, Questions encouraged and answered, Pt discharged home.

## 2025-02-20 LAB
ATRIAL RATE - MUSE: 91 BPM
DIASTOLIC BLOOD PRESSURE - MUSE: NORMAL MMHG
INTERPRETATION ECG - MUSE: NORMAL
P AXIS - MUSE: 51 DEGREES
PR INTERVAL - MUSE: 150 MS
QRS DURATION - MUSE: 70 MS
QT - MUSE: 376 MS
QTC - MUSE: 462 MS
R AXIS - MUSE: 101 DEGREES
SYSTOLIC BLOOD PRESSURE - MUSE: NORMAL MMHG
T AXIS - MUSE: 44 DEGREES
VENTRICULAR RATE- MUSE: 91 BPM

## 2025-03-13 ENCOUNTER — ANCILLARY PROCEDURE (OUTPATIENT)
Dept: ULTRASOUND IMAGING | Facility: CLINIC | Age: 30
End: 2025-03-13
Attending: REGISTERED NURSE
Payer: COMMERCIAL

## 2025-03-13 DIAGNOSIS — Z30.430 ENCOUNTER FOR INSERTION OF INTRAUTERINE CONTRACEPTIVE DEVICE: ICD-10-CM

## 2025-03-13 PROCEDURE — 76377 3D RENDER W/INTRP POSTPROCES: CPT

## 2025-03-13 PROCEDURE — 76830 TRANSVAGINAL US NON-OB: CPT | Mod: 26 | Performed by: OBSTETRICS & GYNECOLOGY

## 2025-03-13 PROCEDURE — 76377 3D RENDER W/INTRP POSTPROCES: CPT | Mod: 26 | Performed by: OBSTETRICS & GYNECOLOGY

## 2025-03-13 PROCEDURE — 76830 TRANSVAGINAL US NON-OB: CPT

## 2025-03-15 ENCOUNTER — HEALTH MAINTENANCE LETTER (OUTPATIENT)
Age: 30
End: 2025-03-15

## 2025-03-25 ENCOUNTER — OFFICE VISIT (OUTPATIENT)
Dept: FAMILY MEDICINE | Facility: CLINIC | Age: 30
End: 2025-03-25
Payer: COMMERCIAL

## 2025-03-25 VITALS
TEMPERATURE: 97.8 F | SYSTOLIC BLOOD PRESSURE: 116 MMHG | WEIGHT: 200 LBS | DIASTOLIC BLOOD PRESSURE: 60 MMHG | HEART RATE: 72 BPM | BODY MASS INDEX: 32.14 KG/M2 | HEIGHT: 66 IN | OXYGEN SATURATION: 98 % | RESPIRATION RATE: 18 BRPM

## 2025-03-25 DIAGNOSIS — R79.89 ELEVATED PARATHYROID HORMONE: Primary | ICD-10-CM

## 2025-03-25 DIAGNOSIS — F33.1 MODERATE EPISODE OF RECURRENT MAJOR DEPRESSIVE DISORDER (H): ICD-10-CM

## 2025-03-25 DIAGNOSIS — Z98.84 STATUS POST BARIATRIC SURGERY: ICD-10-CM

## 2025-03-25 DIAGNOSIS — I50.32 CHRONIC HEART FAILURE WITH PRESERVED EJECTION FRACTION (HFPEF) (H): ICD-10-CM

## 2025-03-25 DIAGNOSIS — F41.1 GAD (GENERALIZED ANXIETY DISORDER): ICD-10-CM

## 2025-03-25 LAB — TSH SERPL DL<=0.005 MIU/L-ACNC: 1.76 UIU/ML (ref 0.3–4.2)

## 2025-03-25 PROCEDURE — 96127 BRIEF EMOTIONAL/BEHAV ASSMT: CPT | Performed by: NURSE PRACTITIONER

## 2025-03-25 PROCEDURE — 99215 OFFICE O/P EST HI 40 MIN: CPT | Performed by: NURSE PRACTITIONER

## 2025-03-25 PROCEDURE — 36415 COLL VENOUS BLD VENIPUNCTURE: CPT | Performed by: NURSE PRACTITIONER

## 2025-03-25 PROCEDURE — 84443 ASSAY THYROID STIM HORMONE: CPT | Performed by: NURSE PRACTITIONER

## 2025-03-25 PROCEDURE — G2211 COMPLEX E/M VISIT ADD ON: HCPCS | Performed by: NURSE PRACTITIONER

## 2025-03-25 RX ORDER — HYDROXYZINE HYDROCHLORIDE 25 MG/1
25 TABLET, FILM COATED ORAL
Qty: 90 TABLET | Refills: 0 | Status: SHIPPED | OUTPATIENT
Start: 2025-03-25

## 2025-03-25 RX ORDER — FLUOXETINE 10 MG/1
10 CAPSULE ORAL DAILY
Qty: 90 CAPSULE | Refills: 0 | Status: SHIPPED | OUTPATIENT
Start: 2025-03-25

## 2025-03-25 RX ORDER — LANOLIN ALCOHOL/MO/W.PET/CERES
100 CREAM (GRAM) TOPICAL
COMMUNITY
Start: 2024-12-11

## 2025-03-25 ASSESSMENT — ANXIETY QUESTIONNAIRES
GAD7 TOTAL SCORE: 19
2. NOT BEING ABLE TO STOP OR CONTROL WORRYING: NEARLY EVERY DAY
6. BECOMING EASILY ANNOYED OR IRRITABLE: SEVERAL DAYS
7. FEELING AFRAID AS IF SOMETHING AWFUL MIGHT HAPPEN: NEARLY EVERY DAY
7. FEELING AFRAID AS IF SOMETHING AWFUL MIGHT HAPPEN: NEARLY EVERY DAY
IF YOU CHECKED OFF ANY PROBLEMS ON THIS QUESTIONNAIRE, HOW DIFFICULT HAVE THESE PROBLEMS MADE IT FOR YOU TO DO YOUR WORK, TAKE CARE OF THINGS AT HOME, OR GET ALONG WITH OTHER PEOPLE: VERY DIFFICULT
5. BEING SO RESTLESS THAT IT IS HARD TO SIT STILL: NEARLY EVERY DAY
8. IF YOU CHECKED OFF ANY PROBLEMS, HOW DIFFICULT HAVE THESE MADE IT FOR YOU TO DO YOUR WORK, TAKE CARE OF THINGS AT HOME, OR GET ALONG WITH OTHER PEOPLE?: VERY DIFFICULT
3. WORRYING TOO MUCH ABOUT DIFFERENT THINGS: NEARLY EVERY DAY
GAD7 TOTAL SCORE: 19
1. FEELING NERVOUS, ANXIOUS, OR ON EDGE: NEARLY EVERY DAY
GAD7 TOTAL SCORE: 19
4. TROUBLE RELAXING: NEARLY EVERY DAY

## 2025-03-25 ASSESSMENT — PAIN SCALES - GENERAL: PAINLEVEL_OUTOF10: NO PAIN (0)

## 2025-03-25 ASSESSMENT — PATIENT HEALTH QUESTIONNAIRE - PHQ9: SUM OF ALL RESPONSES TO PHQ QUESTIONS 1-9: 19
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
## 2025-03-25 NOTE — PATIENT INSTRUCTIONS
Magnesium 500-600 mg nightly to help with sleep and anxiety    Parathyroid  Follow-up with bariatric surgery for elevated parathyroid  We will check thyroid today    Heart failure  Continue to see cardiology    Mental health  Restart fluoxetine 10 mg  Restart hydroxyzine at night as needed  Schedule with psychiatry (collaborative care) to get meds stabilized  If this visit is schedule longer out than 3 months, please return here in about 8 weeks

## 2025-03-26 NOTE — RESULT ENCOUNTER NOTE
Aditi,    It was so nice to meet you yesterday. Your thyroid lab was normal and you can let the bariatric clinic know about this when you discuss the high parathyroid with them in April.  If you have any questions, please feel free to contact the clinic.    ANA Hager

## 2025-04-01 DIAGNOSIS — Z95.4 S/P TRICUSPID VALVE REPLACEMENT: Primary | ICD-10-CM

## 2025-04-01 RX ORDER — AMOXICILLIN 500 MG/1
2000 CAPSULE ORAL ONCE
Qty: 4 CAPSULE | Refills: 0 | Status: SHIPPED | OUTPATIENT
Start: 2025-04-01 | End: 2025-04-01